# Patient Record
Sex: MALE | Race: WHITE | URBAN - METROPOLITAN AREA
[De-identification: names, ages, dates, MRNs, and addresses within clinical notes are randomized per-mention and may not be internally consistent; named-entity substitution may affect disease eponyms.]

---

## 2017-02-04 ENCOUNTER — OUTPATIENT (OUTPATIENT)
Dept: OUTPATIENT SERVICES | Facility: HOSPITAL | Age: 62
LOS: 1 days | Discharge: HOME | End: 2017-02-04

## 2017-06-27 DIAGNOSIS — R97.20 ELEVATED PROSTATE SPECIFIC ANTIGEN [PSA]: ICD-10-CM

## 2017-06-27 DIAGNOSIS — E11.9 TYPE 2 DIABETES MELLITUS WITHOUT COMPLICATIONS: ICD-10-CM

## 2017-09-04 ENCOUNTER — EMERGENCY (EMERGENCY)
Facility: HOSPITAL | Age: 62
LOS: 0 days | Discharge: HOME | End: 2017-09-04

## 2017-09-04 DIAGNOSIS — Z79.51 LONG TERM (CURRENT) USE OF INHALED STEROIDS: ICD-10-CM

## 2017-09-04 DIAGNOSIS — J18.9 PNEUMONIA, UNSPECIFIED ORGANISM: ICD-10-CM

## 2017-09-04 DIAGNOSIS — Z87.891 PERSONAL HISTORY OF NICOTINE DEPENDENCE: ICD-10-CM

## 2017-09-04 DIAGNOSIS — R07.81 PLEURODYNIA: ICD-10-CM

## 2017-09-04 DIAGNOSIS — I10 ESSENTIAL (PRIMARY) HYPERTENSION: ICD-10-CM

## 2017-09-04 DIAGNOSIS — Z79.899 OTHER LONG TERM (CURRENT) DRUG THERAPY: ICD-10-CM

## 2017-09-04 DIAGNOSIS — J44.9 CHRONIC OBSTRUCTIVE PULMONARY DISEASE, UNSPECIFIED: ICD-10-CM

## 2018-08-29 ENCOUNTER — OUTPATIENT (OUTPATIENT)
Dept: OUTPATIENT SERVICES | Facility: HOSPITAL | Age: 63
LOS: 1 days | Discharge: HOME | End: 2018-08-29

## 2018-08-29 DIAGNOSIS — N39.0 URINARY TRACT INFECTION, SITE NOT SPECIFIED: ICD-10-CM

## 2018-08-29 DIAGNOSIS — R53.81 OTHER MALAISE: ICD-10-CM

## 2018-08-29 DIAGNOSIS — Z79.899 OTHER LONG TERM (CURRENT) DRUG THERAPY: ICD-10-CM

## 2018-08-29 DIAGNOSIS — Z12.5 ENCOUNTER FOR SCREENING FOR MALIGNANT NEOPLASM OF PROSTATE: ICD-10-CM

## 2018-08-29 DIAGNOSIS — C61 MALIGNANT NEOPLASM OF PROSTATE: ICD-10-CM

## 2018-08-29 DIAGNOSIS — E78.00 PURE HYPERCHOLESTEROLEMIA, UNSPECIFIED: ICD-10-CM

## 2018-08-29 DIAGNOSIS — D50.9 IRON DEFICIENCY ANEMIA, UNSPECIFIED: ICD-10-CM

## 2018-08-30 ENCOUNTER — INPATIENT (INPATIENT)
Facility: HOSPITAL | Age: 63
LOS: 0 days | Discharge: HOME | End: 2018-08-31
Attending: INTERNAL MEDICINE | Admitting: INTERNAL MEDICINE
Payer: COMMERCIAL

## 2018-08-30 VITALS
HEIGHT: 68 IN | RESPIRATION RATE: 18 BRPM | OXYGEN SATURATION: 98 % | WEIGHT: 235.89 LBS | DIASTOLIC BLOOD PRESSURE: 95 MMHG | SYSTOLIC BLOOD PRESSURE: 135 MMHG | TEMPERATURE: 97 F | HEART RATE: 82 BPM

## 2018-08-30 LAB
ALBUMIN SERPL ELPH-MCNC: 4.3 G/DL — SIGNIFICANT CHANGE UP (ref 3.5–5.2)
ALP SERPL-CCNC: 56 U/L — SIGNIFICANT CHANGE UP (ref 30–115)
ALT FLD-CCNC: 37 U/L — SIGNIFICANT CHANGE UP (ref 0–41)
ANION GAP SERPL CALC-SCNC: 13 MMOL/L — SIGNIFICANT CHANGE UP (ref 7–14)
APTT BLD: 31.5 SEC — SIGNIFICANT CHANGE UP (ref 27–39.2)
AST SERPL-CCNC: 18 U/L — SIGNIFICANT CHANGE UP (ref 0–41)
BASE EXCESS BLDV CALC-SCNC: 2.2 MMOL/L — HIGH (ref -2–2)
BASOPHILS # BLD AUTO: 0.04 K/UL — SIGNIFICANT CHANGE UP (ref 0–0.2)
BASOPHILS NFR BLD AUTO: 0.5 % — SIGNIFICANT CHANGE UP (ref 0–1)
BILIRUB SERPL-MCNC: 0.6 MG/DL — SIGNIFICANT CHANGE UP (ref 0.2–1.2)
BUN SERPL-MCNC: 20 MG/DL — SIGNIFICANT CHANGE UP (ref 10–20)
CA-I SERPL-SCNC: 1.2 MMOL/L — SIGNIFICANT CHANGE UP (ref 1.12–1.3)
CALCIUM SERPL-MCNC: 9.2 MG/DL — SIGNIFICANT CHANGE UP (ref 8.5–10.1)
CHLORIDE SERPL-SCNC: 103 MMOL/L — SIGNIFICANT CHANGE UP (ref 98–110)
CO2 SERPL-SCNC: 25 MMOL/L — SIGNIFICANT CHANGE UP (ref 17–32)
CREAT SERPL-MCNC: 1.2 MG/DL — SIGNIFICANT CHANGE UP (ref 0.7–1.5)
EOSINOPHIL # BLD AUTO: 0.12 K/UL — SIGNIFICANT CHANGE UP (ref 0–0.7)
EOSINOPHIL NFR BLD AUTO: 1.6 % — SIGNIFICANT CHANGE UP (ref 0–8)
GAS PNL BLDV: 140 MMOL/L — SIGNIFICANT CHANGE UP (ref 136–145)
GAS PNL BLDV: SIGNIFICANT CHANGE UP
GLUCOSE SERPL-MCNC: 118 MG/DL — HIGH (ref 70–99)
HCO3 BLDV-SCNC: 28 MMOL/L — SIGNIFICANT CHANGE UP (ref 22–29)
HCT VFR BLD CALC: 43.6 % — SIGNIFICANT CHANGE UP (ref 42–52)
HCT VFR BLDA CALC: 47.2 % — HIGH (ref 34–44)
HGB BLD CALC-MCNC: 15.4 G/DL — SIGNIFICANT CHANGE UP (ref 14–18)
HGB BLD-MCNC: 14.4 G/DL — SIGNIFICANT CHANGE UP (ref 14–18)
HOROWITZ INDEX BLDV+IHG-RTO: 21 — SIGNIFICANT CHANGE UP
IMM GRANULOCYTES NFR BLD AUTO: 0.3 % — SIGNIFICANT CHANGE UP (ref 0.1–0.3)
INR BLD: 1.12 RATIO — SIGNIFICANT CHANGE UP (ref 0.65–1.3)
LACTATE BLDV-MCNC: 1.2 MMOL/L — SIGNIFICANT CHANGE UP (ref 0.5–1.6)
LYMPHOCYTES # BLD AUTO: 2.03 K/UL — SIGNIFICANT CHANGE UP (ref 1.2–3.4)
LYMPHOCYTES # BLD AUTO: 27.7 % — SIGNIFICANT CHANGE UP (ref 20.5–51.1)
MCHC RBC-ENTMCNC: 29 PG — SIGNIFICANT CHANGE UP (ref 27–31)
MCHC RBC-ENTMCNC: 33 G/DL — SIGNIFICANT CHANGE UP (ref 32–37)
MCV RBC AUTO: 87.7 FL — SIGNIFICANT CHANGE UP (ref 80–94)
MONOCYTES # BLD AUTO: 0.47 K/UL — SIGNIFICANT CHANGE UP (ref 0.1–0.6)
MONOCYTES NFR BLD AUTO: 6.4 % — SIGNIFICANT CHANGE UP (ref 1.7–9.3)
NEUTROPHILS # BLD AUTO: 4.64 K/UL — SIGNIFICANT CHANGE UP (ref 1.4–6.5)
NEUTROPHILS NFR BLD AUTO: 63.5 % — SIGNIFICANT CHANGE UP (ref 42.2–75.2)
NRBC # BLD: 0 /100 WBCS — SIGNIFICANT CHANGE UP (ref 0–0)
NT-PROBNP SERPL-SCNC: 699 PG/ML — HIGH (ref 0–300)
PCO2 BLDV: 48 MMHG — SIGNIFICANT CHANGE UP (ref 41–51)
PH BLDV: 7.38 — SIGNIFICANT CHANGE UP (ref 7.26–7.43)
PLATELET # BLD AUTO: 172 K/UL — SIGNIFICANT CHANGE UP (ref 130–400)
PO2 BLDV: 35 MMHG — SIGNIFICANT CHANGE UP (ref 20–40)
POTASSIUM BLDV-SCNC: 3.8 MMOL/L — SIGNIFICANT CHANGE UP (ref 3.3–5.6)
POTASSIUM SERPL-MCNC: 4.1 MMOL/L — SIGNIFICANT CHANGE UP (ref 3.5–5)
POTASSIUM SERPL-SCNC: 4.1 MMOL/L — SIGNIFICANT CHANGE UP (ref 3.5–5)
PROT SERPL-MCNC: 6.5 G/DL — SIGNIFICANT CHANGE UP (ref 6–8)
PROTHROM AB SERPL-ACNC: 12.1 SEC — SIGNIFICANT CHANGE UP (ref 9.95–12.87)
RBC # BLD: 4.97 M/UL — SIGNIFICANT CHANGE UP (ref 4.7–6.1)
RBC # FLD: 12.9 % — SIGNIFICANT CHANGE UP (ref 11.5–14.5)
SAO2 % BLDV: 66 % — SIGNIFICANT CHANGE UP
SODIUM SERPL-SCNC: 141 MMOL/L — SIGNIFICANT CHANGE UP (ref 135–146)
TROPONIN T SERPL-MCNC: <0.01 NG/ML — SIGNIFICANT CHANGE UP
WBC # BLD: 7.32 K/UL — SIGNIFICANT CHANGE UP (ref 4.8–10.8)
WBC # FLD AUTO: 7.32 K/UL — SIGNIFICANT CHANGE UP (ref 4.8–10.8)

## 2018-08-30 PROCEDURE — 93970 EXTREMITY STUDY: CPT | Mod: 26

## 2018-08-30 RX ORDER — BUDESONIDE AND FORMOTEROL FUMARATE DIHYDRATE 160; 4.5 UG/1; UG/1
2 AEROSOL RESPIRATORY (INHALATION)
Qty: 0 | Refills: 0 | Status: DISCONTINUED | OUTPATIENT
Start: 2018-08-30 | End: 2018-08-31

## 2018-08-30 RX ORDER — FUROSEMIDE 40 MG
20 TABLET ORAL ONCE
Qty: 0 | Refills: 0 | Status: COMPLETED | OUTPATIENT
Start: 2018-08-30 | End: 2018-08-30

## 2018-08-30 RX ORDER — ALBUTEROL 90 UG/1
2 AEROSOL, METERED ORAL
Qty: 0 | Refills: 0 | COMMUNITY

## 2018-08-30 RX ORDER — ENOXAPARIN SODIUM 100 MG/ML
40 INJECTION SUBCUTANEOUS EVERY 24 HOURS
Qty: 0 | Refills: 0 | Status: DISCONTINUED | OUTPATIENT
Start: 2018-08-30 | End: 2018-08-31

## 2018-08-30 RX ORDER — SODIUM CHLORIDE 9 MG/ML
3 INJECTION INTRAMUSCULAR; INTRAVENOUS; SUBCUTANEOUS ONCE
Qty: 0 | Refills: 0 | Status: COMPLETED | OUTPATIENT
Start: 2018-08-30 | End: 2018-08-30

## 2018-08-30 RX ORDER — ALBUTEROL 90 UG/1
2 AEROSOL, METERED ORAL EVERY 6 HOURS
Qty: 0 | Refills: 0 | Status: DISCONTINUED | OUTPATIENT
Start: 2018-08-30 | End: 2018-08-31

## 2018-08-30 RX ORDER — IPRATROPIUM/ALBUTEROL SULFATE 18-103MCG
3 AEROSOL WITH ADAPTER (GRAM) INHALATION EVERY 6 HOURS
Qty: 0 | Refills: 0 | Status: DISCONTINUED | OUTPATIENT
Start: 2018-08-30 | End: 2018-08-31

## 2018-08-30 RX ADMIN — ENOXAPARIN SODIUM 40 MILLIGRAM(S): 100 INJECTION SUBCUTANEOUS at 22:11

## 2018-08-30 RX ADMIN — SODIUM CHLORIDE 3 MILLILITER(S): 9 INJECTION INTRAMUSCULAR; INTRAVENOUS; SUBCUTANEOUS at 15:53

## 2018-08-30 RX ADMIN — Medication 20 MILLIGRAM(S): at 18:04

## 2018-08-30 RX ADMIN — BUDESONIDE AND FORMOTEROL FUMARATE DIHYDRATE 2 PUFF(S): 160; 4.5 AEROSOL RESPIRATORY (INHALATION) at 22:10

## 2018-08-30 NOTE — H&P ADULT - NSHPREVIEWOFSYSTEMS_GEN_ALL_CORE
CONSTITUTIONAL: No fever, weight loss, or fatigue  EYES: No eye pain, visual disturbances, or discharge  ENMT:  No difficulty hearing, tinnitus, vertigo; No sinus or throat pain  NECK: No pain or stiffness  BREASTS: No pain, masses, or nipple discharge  RESPIRATORY: No cough, wheezing, chills or hemoptysis; No shortness of breath  CARDIOVASCULAR: No chest pain, palpitations, dizziness, or leg swelling  GASTROINTESTINAL: No abdominal or epigastric pain. No nausea, vomiting, or hematemesis; No diarrhea or constipation. No melena or hematochezia.  GENITOURINARY: No dysuria, frequency, hematuria, or incontinence  NEUROLOGICAL: No headaches, memory loss, loss of strength, numbness, or tremors  SKIN: No itching, burning, rashes, or lesions   LYMPH NODES: No enlarged glands  ENDOCRINE: No heat or cold intolerance; No hair loss  MUSCULOSKELETAL: No joint pain or swelling; No muscle, back, or extremity pain  PSYCHIATRIC: No depression, anxiety, mood swings, or difficulty sleeping  HEME/LYMPH: No easy bruising, or bleeding gums  ALLERGY AND IMMUNOLOGIC: No hives or eczema CONSTITUTIONAL: No fever, weight loss, or fatigue  EYES: No eye pain, visual disturbances, or discharge  ENMT:  No difficulty hearing, tinnitus, vertigo; No sinus or throat pain  NECK: No pain or stiffness  BREASTS: No pain, masses, or nipple discharge  RESPIRATORY: No cough, wheezing, chills or hemoptysis; No shortness of breath  CARDIOVASCULAR: No chest pain, palpitations, dizziness, + leg swelling  GASTROINTESTINAL: No abdominal or epigastric pain. No nausea, vomiting, or hematemesis; No diarrhea or constipation. No melena or hematochezia.  GENITOURINARY: No dysuria, frequency, hematuria, or incontinence  NEUROLOGICAL: No headaches, memory loss, loss of strength, numbness, or tremors  SKIN: No itching, burning, rashes, or lesions   LYMPH NODES: No enlarged glands  ENDOCRINE: No heat or cold intolerance; No hair loss  MUSCULOSKELETAL: No joint pain or swelling; No muscle, back, or extremity pain  PSYCHIATRIC: No depression, anxiety, mood swings, or difficulty sleeping  HEME/LYMPH: No easy bruising, or bleeding gums  ALLERGY AND IMMUNOLOGIC: No hives or eczema

## 2018-08-30 NOTE — ED PROVIDER NOTE - NS ED ROS FT
CONST: No fever, chills or bodyaches  EYES: No pain, redness, drainage or visual changes.  ENT: No ear pain or discharge, nasal discharge or congestion. No sore throat  CARD: + orthopnea, + edema of lower extremities. No chest pain, palpitations  RESP: No SOB, cough  GI: No abdominal pain, N/V/D  : no urinary frequency urgency or dysuria.   MS: No joint pain, back pain or extremity pain/injury  SKIN: No rashes  NEURO: No headache, dizziness, paresthesias

## 2018-08-30 NOTE — ED PROVIDER NOTE - ATTENDING CONTRIBUTION TO CARE
63 yo male h/o COPD, DM, HTN, sent by PMD for evaluation of exertional SOB, intermittent chest pain across his lower chest, b/l leg swelling and 61 yo male h/o COPD, DM, HTN, sent by PMD for evaluation of exertional SOB, intermittent chest pain across his lower chest, b/l leg swelling .  Symptom onset while in [place], patient states it was extremely hot and humid there, came back and went to see his PMD who sent him to ED.  Denies any CP or SOB at this time.  Well-appearing, male resting on a stretcher, NAD, PERRL, mmm, speaking full sentences, nml work of breathing, lungs CTA b/l, no wheezing or crackles, RRR, distal pulses intact, abdomen soft, NT/ND, BS present in all quadrants, b/l pitting foot/ankle edema without calf asymmetry or ttp.  Will check labs, CXR,. ECG, plan to admit.

## 2018-08-30 NOTE — ED PROVIDER NOTE - CARE PLAN
Principal Discharge DX:	CHF (congestive heart failure)  Secondary Diagnosis:	Dyspnea  Secondary Diagnosis:	Chest pain

## 2018-08-30 NOTE — H&P ADULT - NSHPPHYSICALEXAM_GEN_ALL_CORE
VITALS:  T(F): 97.4 (08-30-18 @ 15:00), Max: 97.4 (08-30-18 @ 15:00)  HR: 88 (08-30-18 @ 17:24) (82 - 88)  BP: 144/92 (08-30-18 @ 17:24) (135/95 - 144/92)  RR: 19 (08-30-18 @ 17:24) (18 - 19)  SpO2: 96% (08-30-18 @ 17:24) (96% - 98%)    PHYSICAL EXAM:  GENERAL: NAD  HEAD:  Atraumatic, Normocephalic  EYES: conjunctiva and sclera clear  ENMT: Moist mucous membranes  NECK: Supple, Normal thyroid  NERVOUS SYSTEM:  Alert & Oriented X 3, Good concentration; Motor Strength 5/5 B/L upper and lower extremities  CHEST/LUNG: Clear to auscultation bilaterally; No rales, rhonchi, wheezing, or rubs  HEART: Regular rate and rhythm; No murmurs, rubs, or gallops  ABDOMEN: Soft, Nontender, Nondistended; Bowel sounds present  EXTREMITIES:  2+ Peripheral Pulses, No clubbing, cyanosis, mild pitting bipedal edema extending to the knee.  LYMPH: No lymphadenopathy noted  SKIN: No rashes or lesions

## 2018-08-30 NOTE — ED PROVIDER NOTE - PROGRESS NOTE DETAILS
Pt with no prior hx of CHF.  No recent stress test in 10 years.  Former hx of smoking.  Agrees with decision to admit for further work up. Heart score 4

## 2018-08-30 NOTE — ED PROVIDER NOTE - PHYSICAL EXAMINATION
CONST: Well appearing in NAD  EYES:  Sclera and conjunctiva clear.  ENT: No nasal discharge. Oropharynx normal appearing, no erythema or exudates. Uvula midline.  NECK: Non-tender, supple  CARD: RRR, Normal S1 S2; Normal rate and rhythm, no murmurs or muffled heart sounds.   RESP: mild crackles at bases, Equal BS B/L, No distress, speaking in full sentences without difficulty, no acute distress, no accessory muscle use  GI: Soft, non-tender, non-distended.  MS: +3 edema of bilateral lower extremities, no calf tenderness, Normal ROM in all extremities.  pedal pulses 2+ bilaterally  SKIN: Warm, dry, no acute rashes. Good turgor  NEURO: A&Ox3, No focal deficits. Strength 5/5 with no sensory deficits. Steady gait

## 2018-08-30 NOTE — H&P ADULT - NSHPSOCIALHISTORY_GEN_ALL_CORE
Lives at home with family.  Current smoker: cut down to 1ppd. Has been smoking for 45 years.  Occasional Alcohol use. Denies recreational drug use.

## 2018-08-30 NOTE — ED PROVIDER NOTE - OBJECTIVE STATEMENT
61 yo male with hx of COPD, prostate CA, HTN, HLD presents to the ED for evaluation of edema of lower extremities and orthopnea x 7 days.  Pt states that he recently went on vacation and noticed edema of lower extremities and started to develop orthopnea.  Since onset sx progressively worse with today being the worst prompting visit to the ED.  Admits to reduced exercise tolerance.  Adds no other complaints at this time.  Denies chest pain currently, pleuritic chest pain, nausea, vomiting, fever, chills, calf pain, abdominal pain. FHX of MI at 56 y.o.  last stress test > 10 years ago. + former smoker

## 2018-08-30 NOTE — H&P ADULT - ASSESSMENT
Assessment and Plan:    1. Lower extremity swelling:  r/o Systemic causes: Follow up ECHO, UA and Ultrasound.  Lower extremity Venous duplex.      2. COPD: No acute exacerbation  Not oxygen dependent.  Follow up with Dr. Weems.  Continue home medications      3. Hypertension:  Resume home medications.        DVT prophylaxis: Heparin. Patient is a 61 yo male h/o COPD not on home oxygen, BPH and HTN sent in by PMD for evaluation of exertional Dyspnea, bilateral ankle swelling and intermittent chest pain. Patient had been in his usual state of health until a few days ago during his time in [place] when symptoms started. Chest pain is across his lower chest, no radiation, associated with dyspnea, PND and b/l leg swelling. Patient at the time of presentation denied any CP or SOB but concerned about his leg swelling. He denied any Pain, prior episodes fever, chills, palpitations or prior cardiac work up.       Assessment and Plan:    1. Lower extremity swelling:  r/o Systemic causes: Follow up ECHO, UA and Ultrasound.  Lower extremity Venous duplex.  Hold off diuresis for now: very mild swelling. Hold Amlodipine as may cause Pulmonary and peripheral edema.      2. COPD: No acute exacerbation  Not oxygen dependent.  Follow up with Dr. Weems.  Continue home medications      3. Hypertension:  Hold Amlodipine.  Start Lisinopril.        DVT prophylaxis: Heparin.

## 2018-08-30 NOTE — H&P ADULT - PMH
COPD (chronic obstructive pulmonary disease)    Current smoker    HTN (hypertension)    Prostate disease

## 2018-08-31 ENCOUNTER — TRANSCRIPTION ENCOUNTER (OUTPATIENT)
Age: 63
End: 2018-08-31

## 2018-08-31 VITALS
HEART RATE: 75 BPM | SYSTOLIC BLOOD PRESSURE: 162 MMHG | TEMPERATURE: 96 F | DIASTOLIC BLOOD PRESSURE: 67 MMHG | RESPIRATION RATE: 16 BRPM

## 2018-08-31 LAB
CK SERPL-CCNC: 846 U/L — HIGH (ref 0–225)
TROPONIN T SERPL-MCNC: <0.01 NG/ML — SIGNIFICANT CHANGE UP
TROPONIN T SERPL-MCNC: <0.01 NG/ML — SIGNIFICANT CHANGE UP

## 2018-08-31 RX ORDER — LISINOPRIL 2.5 MG/1
10 TABLET ORAL DAILY
Qty: 0 | Refills: 0 | Status: DISCONTINUED | OUTPATIENT
Start: 2018-08-31 | End: 2018-08-31

## 2018-08-31 RX ORDER — FUROSEMIDE 40 MG
1 TABLET ORAL
Qty: 15 | Refills: 0
Start: 2018-08-31 | End: 2018-09-29

## 2018-08-31 RX ORDER — LISINOPRIL 2.5 MG/1
1 TABLET ORAL
Qty: 30 | Refills: 0 | OUTPATIENT
Start: 2018-08-31 | End: 2018-09-29

## 2018-08-31 RX ORDER — FUROSEMIDE 40 MG
20 TABLET ORAL DAILY
Qty: 0 | Refills: 0 | Status: DISCONTINUED | OUTPATIENT
Start: 2018-08-31 | End: 2018-08-31

## 2018-08-31 RX ORDER — AMLODIPINE BESYLATE 2.5 MG/1
1 TABLET ORAL
Qty: 0 | Refills: 0 | COMMUNITY

## 2018-08-31 RX ORDER — LEVOFLOXACIN 5 MG/ML
1 INJECTION, SOLUTION INTRAVENOUS
Qty: 7 | Refills: 0
Start: 2018-08-31 | End: 2018-09-06

## 2018-08-31 RX ADMIN — LISINOPRIL 10 MILLIGRAM(S): 2.5 TABLET ORAL at 12:10

## 2018-08-31 RX ADMIN — Medication 20 MILLIGRAM(S): at 12:10

## 2018-08-31 RX ADMIN — BUDESONIDE AND FORMOTEROL FUMARATE DIHYDRATE 2 PUFF(S): 160; 4.5 AEROSOL RESPIRATORY (INHALATION) at 07:54

## 2018-08-31 NOTE — DISCHARGE NOTE ADULT - CARE PROVIDERS DIRECT ADDRESSES
[email or web address]

## 2018-08-31 NOTE — DISCHARGE NOTE ADULT - PATIENT PORTAL LINK FT
You can access the [facility] Patient Portal, offered by [facility], by registering with the following website: [email or web address]

## 2018-08-31 NOTE — DISCHARGE NOTE ADULT - HOSPITAL COURSE
Patient is a 63 yo male h/o COPD not on home oxygen, BPH and HTN sent in by PMD for evaluation of exertional Dyspnea, bilateral ankle swelling and intermittent chest pain. Patient had been in his usual state of health until a few days ago during his time in [place] when symptoms started. Chest pain is across his lower chest, no radiation, associated with dyspnea, PND and b/l leg swelling. Patient at the time of presentation denied any CP or SOB but concerned about his leg swelling. He denied any Pain, prior episodes fever, chills, palpitations or prior cardiac work up.       Assessment and Plan:    1. Lower extremity swelling:  Systemic causes ruled out. ECHO unremarkable, LE ultrasound negative for DVT. LFTs wnl.  Started on Lasix 20 mg until seen by PMD.  ? Due to CCB. Advised to discontinue Amlodipine.      2. COPD: No acute exacerbation  Not oxygen dependent.  Follow up with Dr. Weems out patient.  Continue home medications      3. Hypertension:  Hold Amlodipine.  Started on Lisinopril.      4. Rt Lung mass:  r/o Malignancy.   Patient asked to follow up with Dr. Weems pulmonologist for further evaluation.  Smoking cessation advised. Patient is a 61 yo male h/o COPD not on home oxygen, BPH and HTN sent in by PMD for evaluation of exertional Dyspnea, bilateral ankle swelling and intermittent chest pain. Patient had been in his usual state of health until a few days ago during his time in [place] when symptoms started. Chest pain is across his lower chest, no radiation, associated with dyspnea, PND and b/l leg swelling. Patient at the time of presentation denied any CP or SOB but concerned about his leg swelling. He denied any Pain, prior episodes fever, chills, palpitations or prior cardiac work up.       Assessment and Plan:    1. Lower extremity swelling:  Systemic causes ruled out. ECHO unremarkable, LE ultrasound negative for DVT. LFTs wnl.  Started on Lasix 20 mg until seen by PMD.  ? Due to CCB. Advised to discontinue Amlodipine.      2. COPD: No acute exacerbation  Not oxygen dependent.  Follow up with Dr. Weems out patient.  Continue home medications      3. Hypertension:  Hold Amlodipine.  Started on Lisinopril.      4. Rt Lung mass:  r/o Malignancy. ? Focal Pneumonia. Levaquin ordered for 7 days.  Patient asked to follow up with Dr. Weems pulmonologist for further evaluation.  Smoking cessation advised.

## 2018-08-31 NOTE — DISCHARGE NOTE ADULT - CARE PLAN
Principal Discharge DX:	Lower extremity edema  Goal:	Prevent recurrence  Assessment and plan of treatment:	STOP Amlodipine. Take Lasix as prescribed.  Follow up with PMD.  Secondary Diagnosis:	HTN (hypertension)  Goal:	Good Blood pressure control  Assessment and plan of treatment:	Take Lisinopril as prescribed.  Secondary Diagnosis:	Abnormal abdominal CT scan  Goal:	Further evaluation to r/o Malignancy  Assessment and plan of treatment:	Please follow up with Dr. Weems within 1 week after discharge.  Secondary Diagnosis:	Current smoker  Goal:	Smoking cessation.  Assessment and plan of treatment:	Please quit smoking. Ask PMD for assistance if needed.

## 2018-08-31 NOTE — PROGRESS NOTE ADULT - SUBJECTIVE AND OBJECTIVE BOX
PHILIP XIOMY  62y  Male    Patient is a 62y old  Male who presents with a chief complaint of Bilateral ankle swelling (30 Aug 2018 18:36)      INTERVAL HPI/OVERNIGHT EVENTS:      REVIEW OF SYSTEMS:  CONSTITUTIONAL: No fever, weight loss, or fatigue  EYES: No eye pain, visual disturbances, or discharge  ENMT:  No difficulty hearing, tinnitus, vertigo; No sinus or throat pain  NECK: No pain or stiffness  BREASTS: No pain, masses, or nipple discharge  RESPIRATORY: No cough, wheezing, chills or hemoptysis; No shortness of breath  CARDIOVASCULAR: No chest pain, palpitations, dizziness, or leg swelling  GASTROINTESTINAL: No abdominal or epigastric pain. No nausea, vomiting, or hematemesis; No diarrhea or constipation. No melena or hematochezia.  GENITOURINARY: No dysuria, frequency, hematuria, or incontinence  NEUROLOGICAL: No headaches, memory loss, loss of strength, numbness, or tremors  SKIN: No itching, burning, rashes, or lesions   LYMPH NODES: No enlarged glands  ENDOCRINE: No heat or cold intolerance; No hair loss  MUSCULOSKELETAL: No joint pain or swelling; No muscle, back, or extremity pain  PSYCHIATRIC: No depression, anxiety, mood swings, or difficulty sleeping  HEME/LYMPH: No easy bruising, or bleeding gums  ALLERY AND IMMUNOLOGIC: No hives or eczema    VITALS:  T(F): 96.1 (08-31-18 @ 05:15), Max: 98.4 (08-30-18 @ 20:12)  HR: 75 (08-31-18 @ 05:15) (53 - 88)  BP: 162/67 (08-31-18 @ 05:15) (118/59 - 162/67)  RR: 16 (08-31-18 @ 05:15) (16 - 19)  SpO2: 96% (08-30-18 @ 17:24) (96% - 98%)    PHYSICAL EXAM:  GENERAL: NAD, well-groomed, well-developed  HEAD:  Atraumatic, Normocephalic  EYES: EOMI, PERRLA, conjunctiva and sclera clear  ENMT: No tonsillar erythema, exudates, or enlargement; Moist mucous membranes, Good dentition, No lesions  NECK: Supple, No JVD, Normal thyroid  NERVOUS SYSTEM:  Alert & Oriented X3, Good concentration; Motor Strength 5/5 B/L upper and lower extremities; DTRs 2+ intact and symmetric  CHEST/LUNG: Clear to percussion bilaterally; No rales, rhonchi, wheezing, or rubs  HEART: Regular rate and rhythm; No murmurs, rubs, or gallops  ABDOMEN: Soft, Nontender, Nondistended; Bowel sounds present  EXTREMITIES:  2+ Peripheral Pulses, No clubbing, cyanosis, or edema  LYMPH: No lymphadenopathy noted  SKIN: No rashes or lesions    Consultant(s) Notes Reviewed:  [x ] YES  [ ] NO  Care Discussed with Consultants/Other Providers [ x] YES  [ ] NO    LABS:                        14.4   7.32  )-----------( 172      ( 30 Aug 2018 15:30 )             43.6     08-30    141  |  103  |  20  ----------------------------<  118<H>  4.1   |  25  |  1.2    Ca    9.2      30 Aug 2018 15:30    TPro  6.5  /  Alb  4.3  /  TBili  0.6  /  DBili  x   /  AST  18  /  ALT  37  /  AlkPhos  56  08-30    CARDIAC MARKERS ( 31 Aug 2018 07:03 )  x     / <0.01 ng/mL / 846 U/L / x     / x      CARDIAC MARKERS ( 30 Aug 2018 15:30 )  x     / <0.01 ng/mL / x     / x     / x            MICROBIOLOGY: None      RADIOLOGY & ADDITIONAL TESTS:  X-ray Chest 2 Views PA/Lat (08.30.18 @ 15:47)   Support devices: None.    Cardiac/mediastinum/hilum: Unremarkable.    Lung parenchyma/Pleura: Right perihilar/suprahilar opacity. No pleural   effusion or air leak    Skeleton/soft tissues: Unremarkable.    Impression:      Right perihilar/suprahilar opacity. No pleural effusion or air leak  A call back request was created         US Abdomen Complete (08.30.18 @ 19:52)  LIVER:  The elongated liver is normal in contour and echogenicity   measuring 19.7 cm in length x 14.5 cm AP with no focal mass or dilatation   of the intrahepatic bile ducts..     GALLBLADDER/BILIARY TREE: The gallbladder is visualized with no evidence   of stones, sludge, wall thickening or a pericholecystic fluid collection.   The common bile duct is dilated measuring 0.76 cm..  There is a negative   sonographic Hernadez's sign.       PANCREAS: Obscured by bowel gas..    SPLEEN:  Within normal limits in size measuring 10.4 cm in length x 4.9   cm in width with no focal mass or perisplenic fluid collection.    KIDNEYS:  Right kidney measures 11.5 cm in length x 5.8 cm in width x 5.2   cm AP and the left kidney measures 13 cm x 5.3 cm x 6.8 cm. . Bilateral   renal vascular flow. No hydronephrosis, solid mass, stone or perinephric   fluid collection..       AORTA/IVC:  Visualized proximal portions unremarkable.    ASCITES:  No abdominal ascites or pleural effusions. Limited   visualization of a lobulated prostate.         IMPRESSION: Common bile duct dilatation 0.76 cm. .      VA Duplex Lower Ext Vein Scan, Bilat (08.30.18 @ 19:49)   The common femoral, great saphenous, femoral, popliteal and small   saphenous veins were visualized bilaterally with no evidence of deep   venous thrombosis    All veins were fully compressible.  There was presence of spontaneous   flow, augmentation with distal compression and phasicity.    The anterior tibial veins were  patent     The posterior tibial veins were  patent      The peroneal veins were patent.    Right Baker's cyst measuring 6.4 x 1.4 x 2.8 cm.    Left Baker's cyst measuring 2.3 x 3.6 x 1.6 cm.    Impression:    No evidence of deep venous thrombosis or superficial thrombophlebitis in   the bilateral lower extremities.    Baker's cyst as measured above        Imaging Personally Reviewed:  [x YES  [ ] NO    MEDICATIONS  (STANDING):  buDESOnide 160 MICROgram(s)/formoterol 4.5 MICROgram(s) Inhaler 2 Puff(s) Inhalation two times a day  enoxaparin Injectable 40 milliGRAM(s) SubCutaneous every 24 hours  furosemide    Tablet 20 milliGRAM(s) Oral daily  lisinopril 10 milliGRAM(s) Oral daily    MEDICATIONS  (PRN):  ALBUTerol    90 MICROgram(s) HFA Inhaler 2 Puff(s) Inhalation every 6 hours PRN Shortness of Breath and/or Wheezing  ALBUTerol/ipratropium for Nebulization 3 milliLiter(s) Nebulizer every 6 hours PRN Shortness of Breath and/or Wheezing    HEALTH ISSUES - PROBLEM Dx:  Current smoker  COPD (chronic obstructive pulmonary disease)  Prostate disease  HTN (hypertension) PHILIPJACINTOXIOMY  62y  Male    Patient is a 62y old  Male who presents with a chief complaint of Bilateral ankle swelling (30 Aug 2018 18:36)      INTERVAL HPI/OVERNIGHT EVENTS:  No interval events. Dyspnea and ankle swelling Resolved.  Patient requesting to be discharged home.      REVIEW OF SYSTEMS:  CONSTITUTIONAL: No fever, weight loss, or fatigue  EYES: No eye pain, visual disturbances, or discharge  ENMT:  No difficulty hearing, tinnitus, vertigo; No sinus or throat pain  NECK: No pain or stiffness  BREASTS: No pain, masses, or nipple discharge  RESPIRATORY: No cough, wheezing, chills or hemoptysis; No shortness of breath  CARDIOVASCULAR: No chest pain, palpitations, dizziness, or leg swelling  GASTROINTESTINAL: No abdominal or epigastric pain. No nausea, vomiting, or hematemesis; No diarrhea or constipation. No melena or hematochezia.  GENITOURINARY: No dysuria, frequency, hematuria, or incontinence  NEUROLOGICAL: No headaches, memory loss, loss of strength, numbness, or tremors  SKIN: No itching, burning, rashes, or lesions   LYMPH NODES: No enlarged glands  ENDOCRINE: No heat or cold intolerance; No hair loss  MUSCULOSKELETAL: No joint pain or swelling; No muscle, back, or extremity pain  PSYCHIATRIC: No depression, anxiety, mood swings, or difficulty sleeping  HEME/LYMPH: No easy bruising, or bleeding gums  ALLERGY AND IMMUNOLOGIC: No hives or eczema      VITALS:  T(F): 96.1 (08-31-18 @ 05:15), Max: 98.4 (08-30-18 @ 20:12)  HR: 75 (08-31-18 @ 05:15) (53 - 88)  BP: 162/67 (08-31-18 @ 05:15) (118/59 - 162/67)  RR: 16 (08-31-18 @ 05:15) (16 - 19)  SpO2: 96% (08-30-18 @ 17:24) (96% - 98%)    PHYSICAL EXAM:  GENERAL: NAD, well-groomed, well-developed  HEAD:  Atraumatic, Normocephalic  EYES: conjunctiva and sclera clear  ENMT: Moist mucous membranes  NECK: Supple, Normal thyroid  NERVOUS SYSTEM:  Alert & Oriented X 3, Good concentration; Motor Strength 5/5 B/L upper and lower extremities  CHEST/LUNG: Clear to auscultation bilaterally; No rales, rhonchi, wheezing, or rubs  HEART: Regular rate and rhythm; No murmurs, rubs, or gallops  ABDOMEN: Soft, Nontender, obese; Bowel sounds present  EXTREMITIES:  2+ Peripheral Pulses, No clubbing, cyanosis, or edema  LYMPH: No lymphadenopathy noted  SKIN: No rashes or lesions    Consultant(s) Notes Reviewed:  [x ] YES  [ ] NO  Care Discussed with Consultants/Other Providers [ x] YES  [ ] NO    LABS:                        14.4   7.32  )-----------( 172      ( 30 Aug 2018 15:30 )             43.6     08-30    141  |  103  |  20  ----------------------------<  118<H>  4.1   |  25  |  1.2    Ca    9.2      30 Aug 2018 15:30    TPro  6.5  /  Alb  4.3  /  TBili  0.6  /  DBili  x   /  AST  18  /  ALT  37  /  AlkPhos  56  08-30    CARDIAC MARKERS ( 31 Aug 2018 07:03 )  x     / <0.01 ng/mL / 846 U/L / x     / x      CARDIAC MARKERS ( 30 Aug 2018 15:30 )  x     / <0.01 ng/mL / x     / x     / x            MICROBIOLOGY: None      RADIOLOGY & ADDITIONAL TESTS:  X-ray Chest 2 Views PA/Lat (08.30.18 @ 15:47)   Support devices: None.    Cardiac/mediastinum/hilum: Unremarkable.    Lung parenchyma/Pleura: Right perihilar/suprahilar opacity. No pleural   effusion or air leak    Skeleton/soft tissues: Unremarkable.    Impression:      Right perihilar/suprahilar opacity. No pleural effusion or air leak  A call back request was created         US Abdomen Complete (08.30.18 @ 19:52)  LIVER:  The elongated liver is normal in contour and echogenicity   measuring 19.7 cm in length x 14.5 cm AP with no focal mass or dilatation   of the intrahepatic bile ducts..     GALLBLADDER/BILIARY TREE: The gallbladder is visualized with no evidence   of stones, sludge, wall thickening or a pericholecystic fluid collection.   The common bile duct is dilated measuring 0.76 cm..  There is a negative   sonographic Hernadez's sign.       PANCREAS: Obscured by bowel gas..    SPLEEN:  Within normal limits in size measuring 10.4 cm in length x 4.9   cm in width with no focal mass or perisplenic fluid collection.    KIDNEYS:  Right kidney measures 11.5 cm in length x 5.8 cm in width x 5.2   cm AP and the left kidney measures 13 cm x 5.3 cm x 6.8 cm. . Bilateral   renal vascular flow. No hydronephrosis, solid mass, stone or perinephric   fluid collection..       AORTA/IVC:  Visualized proximal portions unremarkable.    ASCITES:  No abdominal ascites or pleural effusions. Limited   visualization of a lobulated prostate.         IMPRESSION: Common bile duct dilatation 0.76 cm. .      VA Duplex Lower Ext Vein Scan, Bilat (08.30.18 @ 19:49)   The common femoral, great saphenous, femoral, popliteal and small   saphenous veins were visualized bilaterally with no evidence of deep   venous thrombosis    All veins were fully compressible.  There was presence of spontaneous   flow, augmentation with distal compression and phasicity.    The anterior tibial veins were  patent     The posterior tibial veins were  patent      The peroneal veins were patent.    Right Baker's cyst measuring 6.4 x 1.4 x 2.8 cm.    Left Baker's cyst measuring 2.3 x 3.6 x 1.6 cm.    Impression:    No evidence of deep venous thrombosis or superficial thrombophlebitis in   the bilateral lower extremities.    Baker's cyst as measured above        Imaging Personally Reviewed:  [x YES  [ ] NO    MEDICATIONS  (STANDING):  buDESOnide 160 MICROgram(s)/formoterol 4.5 MICROgram(s) Inhaler 2 Puff(s) Inhalation two times a day  enoxaparin Injectable 40 milliGRAM(s) SubCutaneous every 24 hours  furosemide    Tablet 20 milliGRAM(s) Oral daily  lisinopril 10 milliGRAM(s) Oral daily    MEDICATIONS  (PRN):  ALBUTerol    90 MICROgram(s) HFA Inhaler 2 Puff(s) Inhalation every 6 hours PRN Shortness of Breath and/or Wheezing  ALBUTerol/ipratropium for Nebulization 3 milliLiter(s) Nebulizer every 6 hours PRN Shortness of Breath and/or Wheezing    HEALTH ISSUES - PROBLEM Dx:  Current smoker  COPD (chronic obstructive pulmonary disease)  Prostate disease  HTN (hypertension)

## 2018-08-31 NOTE — DISCHARGE NOTE ADULT - OTHER SIGNIFICANT FINDINGS
LABS:                        14.4   7.32  )-----------( 172      ( 30 Aug 2018 15:30 )             43.6     08-30    141  |  103  |  20  ----------------------------<  118<H>  4.1   |  25  |  1.2    Ca    9.2      30 Aug 2018 15:30    TPro  6.5  /  Alb  4.3  /  TBili  0.6  /  DBili  x   /  AST  18  /  ALT  37  /  AlkPhos  56  08-30    CARDIAC MARKERS ( 31 Aug 2018 07:03 )  x     / <0.01 ng/mL / 846 U/L / x     / x      CARDIAC MARKERS ( 30 Aug 2018 15:30 )  x     / <0.01 ng/mL / x     / x     / x            MICROBIOLOGY: None      RADIOLOGY & ADDITIONAL TESTS:  X-ray Chest 2 Views PA/Lat (08.30.18 @ 15:47)   Support devices: None.    Cardiac/mediastinum/hilum: Unremarkable.    Lung parenchyma/Pleura: Right perihilar/suprahilar opacity. No pleural   effusion or air leak    Skeleton/soft tissues: Unremarkable.    Impression:      Right perihilar/suprahilar opacity. No pleural effusion or air leak  A call back request was created         US Abdomen Complete (08.30.18 @ 19:52)  LIVER:  The elongated liver is normal in contour and echogenicity   measuring 19.7 cm in length x 14.5 cm AP with no focal mass or dilatation   of the intrahepatic bile ducts..     GALLBLADDER/BILIARY TREE: The gallbladder is visualized with no evidence   of stones, sludge, wall thickening or a pericholecystic fluid collection.   The common bile duct is dilated measuring 0.76 cm..  There is a negative   sonographic Hernadez's sign.       PANCREAS: Obscured by bowel gas..    SPLEEN:  Within normal limits in size measuring 10.4 cm in length x 4.9   cm in width with no focal mass or perisplenic fluid collection.    KIDNEYS:  Right kidney measures 11.5 cm in length x 5.8 cm in width x 5.2   cm AP and the left kidney measures 13 cm x 5.3 cm x 6.8 cm. . Bilateral   renal vascular flow. No hydronephrosis, solid mass, stone or perinephric   fluid collection..       AORTA/IVC:  Visualized proximal portions unremarkable.    ASCITES:  No abdominal ascites or pleural effusions. Limited   visualization of a lobulated prostate.         IMPRESSION: Common bile duct dilatation 0.76 cm. .      VA Duplex Lower Ext Vein Scan, Bilat (08.30.18 @ 19:49)   The common femoral, great saphenous, femoral, popliteal and small   saphenous veins were visualized bilaterally with no evidence of deep   venous thrombosis    All veins were fully compressible.  There was presence of spontaneous   flow, augmentation with distal compression and phasicity.    The anterior tibial veins were  patent     The posterior tibial veins were  patent      The peroneal veins were patent.    Right Baker's cyst measuring 6.4 x 1.4 x 2.8 cm.    Left Baker's cyst measuring 2.3 x 3.6 x 1.6 cm.    Impression:    No evidence of deep venous thrombosis or superficial thrombophlebitis in   the bilateral lower extremities.    Baker's cyst as measured above

## 2018-08-31 NOTE — DISCHARGE NOTE ADULT - CARE PROVIDER_API CALL
Raffi Weems), Medicine  [address] [place] [address]  Phone: [phone]  Fax: [phone]    Bam Lake), Internal Medicine  [address] [place] [address]  Phone: [phone]  Fax: [phone]

## 2018-08-31 NOTE — PROGRESS NOTE ADULT - ASSESSMENT
Patient is a 61 yo male h/o COPD not on home oxygen, BPH and HTN sent in by PMD for evaluation of exertional Dyspnea, bilateral ankle swelling and intermittent chest pain. Patient had been in his usual state of health until a few days ago during his time in [place] when symptoms started. Chest pain is across his lower chest, no radiation, associated with dyspnea, PND and b/l leg swelling. Patient at the time of presentation denied any CP or SOB but concerned about his leg swelling. He denied any Pain, prior episodes fever, chills, palpitations or prior cardiac work up.       Assessment and Plan:    1. Lower extremity swelling:  r/o Systemic causes: Follow up ECHO, UA and Ultrasound.  Lower extremity Venous duplex.  Hold off diuresis for now: very mild swelling. Hold Amlodipine as may cause Pulmonary and peripheral edema.      2. COPD: No acute exacerbation  Not oxygen dependent.  Follow up with Dr. Weems.  Continue home medications      3. Hypertension:  Hold Amlodipine.  Start Lisinopril.        DVT prophylaxis: Heparin. Patient is a 61 yo male h/o COPD not on home oxygen, BPH and HTN sent in by PMD for evaluation of exertional Dyspnea, bilateral ankle swelling and intermittent chest pain. Patient had been in his usual state of health until a few days ago during his time in [place] when symptoms started. Chest pain is across his lower chest, no radiation, associated with dyspnea, PND and b/l leg swelling. Patient at the time of presentation denied any CP or SOB but concerned about his leg swelling. He denied any Pain, prior episodes fever, chills, palpitations or prior cardiac work up.       Assessment and Plan:    1. Lower extremity swelling:  Systemic causes ruled out. ECHO unremarkable, LE ultrasound negative for DVT. LFTs wnl.  Started on Lasix 20 mg until seen by PMD.  ? Due to CCB. Advised to discontinue Amlodipine.      2. COPD: No acute exacerbation  Not oxygen dependent.  Follow up with Dr. Weems out patient.  Continue home medications      3. Hypertension:  Hold Amlodipine.  Started on Lisinopril.      4. Rt Lung mass:  r/o Malignancy.   Patient asked to follow up with Dr. Weems pulmonologist for further evaluation.  Smoking cessation advised.      DVT prophylaxis: Heparin.  Disposition: Discharge home today. Patient is a 63 yo male h/o COPD not on home oxygen, BPH and HTN sent in by PMD for evaluation of exertional Dyspnea, bilateral ankle swelling and intermittent chest pain. Patient had been in his usual state of health until a few days ago during his time in [place] when symptoms started. Chest pain is across his lower chest, no radiation, associated with dyspnea, PND and b/l leg swelling. Patient at the time of presentation denied any CP or SOB but concerned about his leg swelling. He denied any Pain, prior episodes fever, chills, palpitations or prior cardiac work up.       Assessment and Plan:    1. Lower extremity swelling:  Systemic causes ruled out. ECHO unremarkable, LE ultrasound negative for DVT. LFTs wnl.  Started on Lasix 20 mg until seen by PMD.  ? Due to CCB. Advised to discontinue Amlodipine.      2. COPD: No acute exacerbation  Not oxygen dependent.  Follow up with Dr. Weems out patient.  Continue home medications      3. Hypertension:  Hold Amlodipine.  Started on Lisinopril.      4. Rt Lung mass:  r/o Malignancy. ? Focal Pneumonia. Levaquin ordered for 7 days.  Patient asked to follow up with Dr. Weems pulmonologist for further evaluation.  Smoking cessation advised.      DVT prophylaxis: Heparin.  Disposition: Discharge home today.

## 2018-08-31 NOTE — DISCHARGE NOTE ADULT - MEDICATION SUMMARY - MEDICATIONS TO TAKE
I will START or STAY ON the medications listed below when I get home from the hospital:    lisinopril 10 mg oral tablet  -- 1 tab(s) by mouth once a day  -- Indication: For Hypertension    tamsulosin 0.4 mg oral capsule  -- 1 cap(s) by mouth once a day  -- Indication: For BPH    Symbicort 160 mcg-4.5 mcg/inh inhalation aerosol  -- 2 puff(s) inhaled 2 times a day  -- Indication: For COPD (chronic obstructive pulmonary disease)    ProAir HFA 90 mcg/inh inhalation aerosol  -- 2 puff(s) inhaled 4 times a day, As Needed  -- Indication: For COPD (chronic obstructive pulmonary disease)    furosemide 20 mg oral tablet  -- 1 tab(s) by mouth every other day   -- Indication: For Edema

## 2018-08-31 NOTE — DISCHARGE NOTE ADULT - PLAN OF CARE
Prevent recurrence STOP Amlodipine. Take Lasix as prescribed.  Follow up with PMD. Good Blood pressure control Take Lisinopril as prescribed. Further evaluation to r/o Malignancy Please follow up with Dr. Weems within 1 week after discharge. Smoking cessation. Please quit smoking. Ask PMD for assistance if needed.

## 2018-09-01 LAB
HCV AB S/CO SERPL IA: 0.22 S/CO — SIGNIFICANT CHANGE UP
HCV AB SERPL-IMP: SIGNIFICANT CHANGE UP

## 2018-09-14 DIAGNOSIS — M71.21 SYNOVIAL CYST OF POPLITEAL SPACE [BAKER], RIGHT KNEE: ICD-10-CM

## 2018-09-14 DIAGNOSIS — I11.0 HYPERTENSIVE HEART DISEASE WITH HEART FAILURE: ICD-10-CM
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
## 2018-09-14 DIAGNOSIS — N40.0 BENIGN PROSTATIC HYPERPLASIA WITHOUT LOWER URINARY TRACT SYMPTOMS: ICD-10-CM

## 2018-09-14 DIAGNOSIS — M79.89 OTHER SPECIFIED SOFT TISSUE DISORDERS: ICD-10-CM

## 2018-09-14 DIAGNOSIS — Z87.891 PERSONAL HISTORY OF NICOTINE DEPENDENCE: ICD-10-CM

## 2018-09-14 DIAGNOSIS — M71.22 SYNOVIAL CYST OF POPLITEAL SPACE [BAKER], LEFT KNEE: ICD-10-CM

## 2018-09-14 DIAGNOSIS — E78.5 HYPERLIPIDEMIA, UNSPECIFIED: ICD-10-CM

## 2018-09-14 DIAGNOSIS — R91.8 OTHER NONSPECIFIC ABNORMAL FINDING OF LUNG FIELD: ICD-10-CM

## 2018-09-14 DIAGNOSIS — I50.9 HEART FAILURE, UNSPECIFIED: ICD-10-CM

## 2018-09-14 DIAGNOSIS — J44.9 CHRONIC OBSTRUCTIVE PULMONARY DISEASE, UNSPECIFIED: ICD-10-CM

## 2018-09-14 DIAGNOSIS — I87.2 VENOUS INSUFFICIENCY (CHRONIC) (PERIPHERAL): ICD-10-CM

## 2018-09-14 DIAGNOSIS — T50.905A ADVERSE EFFECT OF UNSPECIFIED DRUGS, MEDICAMENTS AND BIOLOGICAL SUBSTANCES, INITIAL ENCOUNTER: ICD-10-CM

## 2018-09-14 DIAGNOSIS — R93.5 ABNORMAL FINDINGS ON DIAGNOSTIC IMAGING OF OTHER ABDOMINAL REGIONS, INCLUDING RETROPERITONEUM: ICD-10-CM

## 2018-09-14 DIAGNOSIS — Z85.46 PERSONAL HISTORY OF MALIGNANT NEOPLASM OF PROSTATE: ICD-10-CM

## 2019-02-07 ENCOUNTER — EMERGENCY (EMERGENCY)
Facility: HOSPITAL | Age: 64
LOS: 0 days | Discharge: HOME | End: 2019-02-07
Attending: EMERGENCY MEDICINE | Admitting: EMERGENCY MEDICINE

## 2019-02-07 VITALS
OXYGEN SATURATION: 99 % | DIASTOLIC BLOOD PRESSURE: 87 MMHG | SYSTOLIC BLOOD PRESSURE: 138 MMHG | HEART RATE: 86 BPM | RESPIRATION RATE: 17 BRPM | TEMPERATURE: 99 F

## 2019-02-07 VITALS
SYSTOLIC BLOOD PRESSURE: 136 MMHG | RESPIRATION RATE: 18 BRPM | OXYGEN SATURATION: 96 % | WEIGHT: 233.91 LBS | DIASTOLIC BLOOD PRESSURE: 71 MMHG | TEMPERATURE: 98 F | HEART RATE: 92 BPM

## 2019-02-07 DIAGNOSIS — Z87.891 PERSONAL HISTORY OF NICOTINE DEPENDENCE: ICD-10-CM

## 2019-02-07 DIAGNOSIS — R05 COUGH: ICD-10-CM

## 2019-02-07 DIAGNOSIS — I10 ESSENTIAL (PRIMARY) HYPERTENSION: ICD-10-CM

## 2019-02-07 DIAGNOSIS — Z79.51 LONG TERM (CURRENT) USE OF INHALED STEROIDS: ICD-10-CM

## 2019-02-07 DIAGNOSIS — Z90.2 ACQUIRED ABSENCE OF LUNG [PART OF]: ICD-10-CM

## 2019-02-07 DIAGNOSIS — Z85.118 PERSONAL HISTORY OF OTHER MALIGNANT NEOPLASM OF BRONCHUS AND LUNG: ICD-10-CM

## 2019-02-07 DIAGNOSIS — Z79.2 LONG TERM (CURRENT) USE OF ANTIBIOTICS: ICD-10-CM

## 2019-02-07 DIAGNOSIS — R10.9 UNSPECIFIED ABDOMINAL PAIN: ICD-10-CM

## 2019-02-07 DIAGNOSIS — K44.9 DIAPHRAGMATIC HERNIA WITHOUT OBSTRUCTION OR GANGRENE: ICD-10-CM

## 2019-02-07 DIAGNOSIS — N40.0 BENIGN PROSTATIC HYPERPLASIA WITHOUT LOWER URINARY TRACT SYMPTOMS: ICD-10-CM

## 2019-02-07 DIAGNOSIS — K57.90 DIVERTICULOSIS OF INTESTINE, PART UNSPECIFIED, WITHOUT PERFORATION OR ABSCESS WITHOUT BLEEDING: ICD-10-CM

## 2019-02-07 DIAGNOSIS — L90.5 SCAR CONDITIONS AND FIBROSIS OF SKIN: ICD-10-CM

## 2019-02-07 DIAGNOSIS — M86.9 OSTEOMYELITIS, UNSPECIFIED: ICD-10-CM

## 2019-02-07 PROBLEM — J44.9 CHRONIC OBSTRUCTIVE PULMONARY DISEASE, UNSPECIFIED: Chronic | Status: ACTIVE | Noted: 2018-08-30

## 2019-02-07 PROBLEM — N42.9 DISORDER OF PROSTATE, UNSPECIFIED: Chronic | Status: ACTIVE | Noted: 2018-08-30

## 2019-02-07 LAB
ALBUMIN SERPL ELPH-MCNC: 4 G/DL — SIGNIFICANT CHANGE UP (ref 3.5–5.2)
ALP SERPL-CCNC: 74 U/L — SIGNIFICANT CHANGE UP (ref 30–115)
ALT FLD-CCNC: 21 U/L — SIGNIFICANT CHANGE UP (ref 0–41)
ANION GAP SERPL CALC-SCNC: 10 MMOL/L — SIGNIFICANT CHANGE UP (ref 7–14)
APPEARANCE UR: CLEAR — SIGNIFICANT CHANGE UP
AST SERPL-CCNC: 13 U/L — SIGNIFICANT CHANGE UP (ref 0–41)
BASOPHILS # BLD AUTO: 0.04 K/UL — SIGNIFICANT CHANGE UP (ref 0–0.2)
BASOPHILS NFR BLD AUTO: 0.5 % — SIGNIFICANT CHANGE UP (ref 0–1)
BILIRUB SERPL-MCNC: 0.2 MG/DL — SIGNIFICANT CHANGE UP (ref 0.2–1.2)
BILIRUB UR-MCNC: NEGATIVE — SIGNIFICANT CHANGE UP
BUN SERPL-MCNC: 16 MG/DL — SIGNIFICANT CHANGE UP (ref 10–20)
CALCIUM SERPL-MCNC: 9.1 MG/DL — SIGNIFICANT CHANGE UP (ref 8.5–10.1)
CHLORIDE SERPL-SCNC: 102 MMOL/L — SIGNIFICANT CHANGE UP (ref 98–110)
CO2 SERPL-SCNC: 28 MMOL/L — SIGNIFICANT CHANGE UP (ref 17–32)
COLOR SPEC: YELLOW — SIGNIFICANT CHANGE UP
CREAT SERPL-MCNC: 1 MG/DL — SIGNIFICANT CHANGE UP (ref 0.7–1.5)
DIFF PNL FLD: NEGATIVE — SIGNIFICANT CHANGE UP
EOSINOPHIL # BLD AUTO: 0.47 K/UL — SIGNIFICANT CHANGE UP (ref 0–0.7)
EOSINOPHIL NFR BLD AUTO: 5.6 % — SIGNIFICANT CHANGE UP (ref 0–8)
GLUCOSE SERPL-MCNC: 159 MG/DL — HIGH (ref 70–99)
GLUCOSE UR QL: NEGATIVE MG/DL — SIGNIFICANT CHANGE UP
HCT VFR BLD CALC: 44.8 % — SIGNIFICANT CHANGE UP (ref 42–52)
HGB BLD-MCNC: 14.8 G/DL — SIGNIFICANT CHANGE UP (ref 14–18)
IMM GRANULOCYTES NFR BLD AUTO: 0.4 % — HIGH (ref 0.1–0.3)
KETONES UR-MCNC: ABNORMAL
LACTATE SERPL-SCNC: 1.4 MMOL/L — SIGNIFICANT CHANGE UP (ref 0.5–2.2)
LEUKOCYTE ESTERASE UR-ACNC: NEGATIVE — SIGNIFICANT CHANGE UP
LIDOCAIN IGE QN: 17 U/L — SIGNIFICANT CHANGE UP (ref 7–60)
LYMPHOCYTES # BLD AUTO: 1.89 K/UL — SIGNIFICANT CHANGE UP (ref 1.2–3.4)
LYMPHOCYTES # BLD AUTO: 22.7 % — SIGNIFICANT CHANGE UP (ref 20.5–51.1)
MCHC RBC-ENTMCNC: 27.5 PG — SIGNIFICANT CHANGE UP (ref 27–31)
MCHC RBC-ENTMCNC: 33 G/DL — SIGNIFICANT CHANGE UP (ref 32–37)
MCV RBC AUTO: 83.3 FL — SIGNIFICANT CHANGE UP (ref 80–94)
MONOCYTES # BLD AUTO: 0.62 K/UL — HIGH (ref 0.1–0.6)
MONOCYTES NFR BLD AUTO: 7.4 % — SIGNIFICANT CHANGE UP (ref 1.7–9.3)
NEUTROPHILS # BLD AUTO: 5.28 K/UL — SIGNIFICANT CHANGE UP (ref 1.4–6.5)
NEUTROPHILS NFR BLD AUTO: 63.4 % — SIGNIFICANT CHANGE UP (ref 42.2–75.2)
NITRITE UR-MCNC: NEGATIVE — SIGNIFICANT CHANGE UP
PH UR: 5.5 — SIGNIFICANT CHANGE UP (ref 5–8)
PLATELET # BLD AUTO: 231 K/UL — SIGNIFICANT CHANGE UP (ref 130–400)
POTASSIUM SERPL-MCNC: 4.1 MMOL/L — SIGNIFICANT CHANGE UP (ref 3.5–5)
POTASSIUM SERPL-SCNC: 4.1 MMOL/L — SIGNIFICANT CHANGE UP (ref 3.5–5)
PROT SERPL-MCNC: 7 G/DL — SIGNIFICANT CHANGE UP (ref 6–8)
PROT UR-MCNC: NEGATIVE MG/DL — SIGNIFICANT CHANGE UP
RBC # BLD: 5.38 M/UL — SIGNIFICANT CHANGE UP (ref 4.7–6.1)
RBC # FLD: 13.1 % — SIGNIFICANT CHANGE UP (ref 11.5–14.5)
SODIUM SERPL-SCNC: 140 MMOL/L — SIGNIFICANT CHANGE UP (ref 135–146)
SP GR SPEC: 1.02 — SIGNIFICANT CHANGE UP (ref 1.01–1.03)
UROBILINOGEN FLD QL: 0.2 MG/DL — SIGNIFICANT CHANGE UP (ref 0.2–0.2)
WBC # BLD: 8.33 K/UL — SIGNIFICANT CHANGE UP (ref 4.8–10.8)
WBC # FLD AUTO: 8.33 K/UL — SIGNIFICANT CHANGE UP (ref 4.8–10.8)

## 2019-02-07 RX ORDER — LEVOFLOXACIN 5 MG/ML
1 INJECTION, SOLUTION INTRAVENOUS
Qty: 5 | Refills: 0
Start: 2019-02-07 | End: 2019-02-11

## 2019-02-07 NOTE — ED PROVIDER NOTE - MEDICAL DECISION MAKING DETAILS
Results d/w patient and spouse and CT findings went over in detail.  Pt will f/u with GI for diverticulosis, (never had colonoscopy).  I spoke with charlee Lala for his Dr Rush at [facility].  Rec pain control and to call for f/u appt tomorrow.  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  Needs outpatient follow up . No interventions at this time.  Pt has pain meds at home.  Will give abx for b/l nodular opacities karl with cough.  Pt will f/u with [facility].  Has appt for repeat scan at beginning opf March.  Pt instructed to return if any worsening symptoms or concerns.  They verbalize understanding.

## 2019-02-07 NOTE — ED PROVIDER NOTE - CARE PLAN
Principal Discharge DX:	Periostitis Principal Discharge DX:	Periostitis  Secondary Diagnosis:	Diverticulosis

## 2019-02-07 NOTE — ED PROVIDER NOTE - ATTENDING CONTRIBUTION TO CARE
62 yo M PMHx COPD, HTN, BPH, lung tumor with lobectomy 10/27/18 presents with c/o pain to right side around incision site x 2 weeks, worsening over the last 4 days.  Pain worse with movements and cough, + dry cough, no fevers or chills.  no abd pain.  On exam pt in NAD AAO x 3, Lungs CA B/L no wrr, abd is osft nt nd, + tender over surgical scar to right side lower rib area, mild swelling, no skin changes, no warmth, no edema, steady gait

## 2019-02-07 NOTE — ED PROVIDER NOTE - CARE PROVIDERS DIRECT ADDRESSES
[email or web address],DirectAddress_Unknown,DirectAddress_Unknown

## 2019-02-07 NOTE — ED PROVIDER NOTE - PHYSICAL EXAMINATION
AOx4, Non toxic appearing, NAD, speaking in full sentences.   Skin - warm and dry, no acute rash.   Head - normocephalic, atraumatic.   Eyes - PERRLA/EOMI, conjunctiva and sclera clear.   ENT- MM moist, no nasal discharge.  Pharynx unremarkable.    Neck - supple nt, no meningeal signs.   Heart - RRR s1s2 nl, no rub/murmur.   Lungs- No retractions, BS equal, CTAB. Well healed incision sites at the right lateral lower ribs, ttp over the incision sites.   Abdomen - soft, TTP over the RUQ with some fullness, muphys negative.  no r/g.   Extremities- moves all, +equal distal pulses, brisk cap refill, sensation wnl, normal ROM. No LE edema, calves nttp b/l.

## 2019-02-07 NOTE — ED PROVIDER NOTE - CARE PROVIDER_API CALL
Santosh Georges)  Surgery; Vascular Surgery  [address] [place] [address], [address] [address]  Phone: [phone]  Fax: [phone]  Follow Up Time:     Jena Laurent)  Gastroenterology  [address] [place] [address]  Phone: [phone]  Fax: [phone]  Follow Up Time:     Dr Howe @ [facility],   Phone: (   )    -  Fax: (   )    -  Follow Up Time:

## 2019-02-07 NOTE — ED PROVIDER NOTE - NS ED ROS FT
Constitutional: See HPI.  Eyes: No visual changes, eye pain or discharge. No Photophobia  ENMT:  No neck pain or stiffness. No limited ROM  Cardiac: No SOB or edema. No chest pain with exertion.  Respiratory: No respiratory distress. No hemoptysis.   GI: No nausea, vomiting, diarrhea.  : No dysuria, frequency or burning. No Discharge  MS: No myalgia, muscle weakness, joint pain.  Neuro: No headache or weakness. No LOC.  Skin: No skin rash.  Except as documented in the HPI, all other systems are negative.

## 2019-02-07 NOTE — ED PROVIDER NOTE - OBJECTIVE STATEMENT
64 yo M with a hx of right lobectomy secondary to lung CA done at [facility] 10/18, COPD, former smoker, HTN, BPH, presents with right flank pain. Progressively worsening over the past 2 weeks, located at and around the incision sites. States that he noted bulging around the area which is located in the right lateral lower rib area and RUQ quadrant. Associated with dry cough. Denies fevers, chills, hemoptysis, dyspnea, NVCD, dysuria, hematuria, leg swelling, calf pain, hx of dvts/PEs, recent travel.

## 2019-02-07 NOTE — ED PROVIDER NOTE - PROVIDER TOKENS
PROVIDER:[TOKEN:[[id number]]],PROVIDER:[TOKEN:[[id number]]],FREE:[LAST:[Dr Howe @ [facility]],PHONE:[(   )    -],FAX:[(   )    -]]

## 2019-02-11 ENCOUNTER — INBOUND DOCUMENT (OUTPATIENT)
Age: 64
End: 2019-02-11

## 2019-02-12 ENCOUNTER — APPOINTMENT (OUTPATIENT)
Dept: VASCULAR SURGERY | Facility: CLINIC | Age: 64
End: 2019-02-12
Payer: COMMERCIAL

## 2019-02-12 VITALS
SYSTOLIC BLOOD PRESSURE: 130 MMHG | HEIGHT: 68 IN | WEIGHT: 234 LBS | BODY MASS INDEX: 35.46 KG/M2 | DIASTOLIC BLOOD PRESSURE: 80 MMHG

## 2019-02-12 DIAGNOSIS — Z87.09 PERSONAL HISTORY OF OTHER DISEASES OF THE RESPIRATORY SYSTEM: ICD-10-CM

## 2019-02-12 DIAGNOSIS — I71.4 ABDOMINAL AORTIC ANEURYSM, W/OUT RUPTURE: ICD-10-CM

## 2019-02-12 DIAGNOSIS — I65.23 OCCLUSION AND STENOSIS OF BILATERAL CAROTID ARTERIES: ICD-10-CM

## 2019-02-12 PROCEDURE — 93880 EXTRACRANIAL BILAT STUDY: CPT

## 2019-02-12 PROCEDURE — 99202 OFFICE O/P NEW SF 15 MIN: CPT

## 2019-02-13 NOTE — ASSESSMENT
[FreeTextEntry1] : The patient is a 63-year-old gentleman with a history of lung CA status post pneumonectomy who presented to the emergency department with epigastric and chest pain. The patient had an incidental finding of a sending and descending thoracic infrarenal aorta and common iliac artery thrombus present. I reviewed the CT scan there was no evidence of significant mural thrombus present. I performed a carotid artery duplex given his history of smoking age and past medical history. Carotid arteries were widely pain no evidence of carotid stenosis. From my standpoint I see no vascular surgery intervention required for this patient and he should follow up with me on a p.r.n. basis.

## 2019-02-13 NOTE — HISTORY OF PRESENT ILLNESS
[FreeTextEntry1] : The patient is a 64 yo male s/p pneumonectomy Presented to the emergency department complaining of right epigastric and chest pain. Patient had a CT scan of his chest abdomen and pelvis and an incidental finding of mural thrombus present in the descending thoracic aorta, infrarenal Dumbly and right common iliac artery. The patient has a history of smoking he denies amaurosis fugax or TIA-like symptoms he denies claudication symptoms.

## 2019-04-30 ENCOUNTER — EMERGENCY (EMERGENCY)
Facility: HOSPITAL | Age: 64
LOS: 0 days | Discharge: HOME | End: 2019-04-30
Attending: EMERGENCY MEDICINE | Admitting: EMERGENCY MEDICINE
Payer: COMMERCIAL

## 2019-04-30 VITALS
RESPIRATION RATE: 17 BRPM | DIASTOLIC BLOOD PRESSURE: 66 MMHG | OXYGEN SATURATION: 95 % | SYSTOLIC BLOOD PRESSURE: 135 MMHG | HEART RATE: 83 BPM | TEMPERATURE: 97 F

## 2019-04-30 VITALS
TEMPERATURE: 98 F | HEART RATE: 89 BPM | OXYGEN SATURATION: 96 % | RESPIRATION RATE: 18 BRPM | DIASTOLIC BLOOD PRESSURE: 80 MMHG | SYSTOLIC BLOOD PRESSURE: 139 MMHG

## 2019-04-30 DIAGNOSIS — J44.9 CHRONIC OBSTRUCTIVE PULMONARY DISEASE, UNSPECIFIED: ICD-10-CM

## 2019-04-30 DIAGNOSIS — Z79.899 OTHER LONG TERM (CURRENT) DRUG THERAPY: ICD-10-CM

## 2019-04-30 DIAGNOSIS — R73.9 HYPERGLYCEMIA, UNSPECIFIED: ICD-10-CM

## 2019-04-30 DIAGNOSIS — F17.200 NICOTINE DEPENDENCE, UNSPECIFIED, UNCOMPLICATED: ICD-10-CM

## 2019-04-30 DIAGNOSIS — Z85.118 PERSONAL HISTORY OF OTHER MALIGNANT NEOPLASM OF BRONCHUS AND LUNG: ICD-10-CM

## 2019-04-30 DIAGNOSIS — I10 ESSENTIAL (PRIMARY) HYPERTENSION: ICD-10-CM

## 2019-04-30 DIAGNOSIS — Z79.51 LONG TERM (CURRENT) USE OF INHALED STEROIDS: ICD-10-CM

## 2019-04-30 LAB
ALBUMIN SERPL ELPH-MCNC: 3.9 G/DL — SIGNIFICANT CHANGE UP (ref 3.5–5.2)
ALP SERPL-CCNC: 76 U/L — SIGNIFICANT CHANGE UP (ref 30–115)
ALT FLD-CCNC: 20 U/L — SIGNIFICANT CHANGE UP (ref 0–41)
ANION GAP SERPL CALC-SCNC: 14 MMOL/L — SIGNIFICANT CHANGE UP (ref 7–14)
APPEARANCE UR: CLEAR — SIGNIFICANT CHANGE UP
AST SERPL-CCNC: 29 U/L — SIGNIFICANT CHANGE UP (ref 0–41)
B-OH-BUTYR SERPL-SCNC: 0.4 MMOL/L — SIGNIFICANT CHANGE UP
BASE EXCESS BLDV CALC-SCNC: 2.1 MMOL/L — HIGH (ref -2–2)
BASOPHILS # BLD AUTO: 0.04 K/UL — SIGNIFICANT CHANGE UP (ref 0–0.2)
BASOPHILS NFR BLD AUTO: 0.6 % — SIGNIFICANT CHANGE UP (ref 0–1)
BILIRUB SERPL-MCNC: 0.4 MG/DL — SIGNIFICANT CHANGE UP (ref 0.2–1.2)
BILIRUB UR-MCNC: NEGATIVE — SIGNIFICANT CHANGE UP
BUN SERPL-MCNC: 17 MG/DL — SIGNIFICANT CHANGE UP (ref 10–20)
CA-I SERPL-SCNC: 1.24 MMOL/L — SIGNIFICANT CHANGE UP (ref 1.12–1.3)
CALCIUM SERPL-MCNC: 9.6 MG/DL — SIGNIFICANT CHANGE UP (ref 8.5–10.1)
CHLORIDE SERPL-SCNC: 99 MMOL/L — SIGNIFICANT CHANGE UP (ref 98–110)
CO2 SERPL-SCNC: 23 MMOL/L — SIGNIFICANT CHANGE UP (ref 17–32)
COLOR SPEC: YELLOW — SIGNIFICANT CHANGE UP
CREAT SERPL-MCNC: 0.8 MG/DL — SIGNIFICANT CHANGE UP (ref 0.7–1.5)
DIFF PNL FLD: NEGATIVE — SIGNIFICANT CHANGE UP
EOSINOPHIL # BLD AUTO: 0.21 K/UL — SIGNIFICANT CHANGE UP (ref 0–0.7)
EOSINOPHIL NFR BLD AUTO: 3.1 % — SIGNIFICANT CHANGE UP (ref 0–8)
EPI CELLS # UR: ABNORMAL /HPF
GAS PNL BLDV: 136 MMOL/L — SIGNIFICANT CHANGE UP (ref 136–145)
GAS PNL BLDV: SIGNIFICANT CHANGE UP
GLUCOSE SERPL-MCNC: 335 MG/DL — HIGH (ref 70–99)
GLUCOSE UR QL: 500 MG/DL
HCO3 BLDV-SCNC: 28 MMOL/L — SIGNIFICANT CHANGE UP (ref 22–29)
HCT VFR BLD CALC: 44.8 % — SIGNIFICANT CHANGE UP (ref 42–52)
HCT VFR BLDA CALC: 46.6 % — HIGH (ref 34–44)
HGB BLD CALC-MCNC: 15.2 G/DL — SIGNIFICANT CHANGE UP (ref 14–18)
HGB BLD-MCNC: 14.8 G/DL — SIGNIFICANT CHANGE UP (ref 14–18)
HOROWITZ INDEX BLDV+IHG-RTO: 21 — SIGNIFICANT CHANGE UP
IMM GRANULOCYTES NFR BLD AUTO: 0.4 % — HIGH (ref 0.1–0.3)
KETONES UR-MCNC: ABNORMAL
LACTATE BLDV-MCNC: 1.3 MMOL/L — SIGNIFICANT CHANGE UP (ref 0.5–1.6)
LEUKOCYTE ESTERASE UR-ACNC: NEGATIVE — SIGNIFICANT CHANGE UP
LYMPHOCYTES # BLD AUTO: 1.5 K/UL — SIGNIFICANT CHANGE UP (ref 1.2–3.4)
LYMPHOCYTES # BLD AUTO: 22.5 % — SIGNIFICANT CHANGE UP (ref 20.5–51.1)
MAGNESIUM SERPL-MCNC: 2.1 MG/DL — SIGNIFICANT CHANGE UP (ref 1.8–2.4)
MCHC RBC-ENTMCNC: 27.7 PG — SIGNIFICANT CHANGE UP (ref 27–31)
MCHC RBC-ENTMCNC: 33 G/DL — SIGNIFICANT CHANGE UP (ref 32–37)
MCV RBC AUTO: 83.7 FL — SIGNIFICANT CHANGE UP (ref 80–94)
MONOCYTES # BLD AUTO: 0.43 K/UL — SIGNIFICANT CHANGE UP (ref 0.1–0.6)
MONOCYTES NFR BLD AUTO: 6.4 % — SIGNIFICANT CHANGE UP (ref 1.7–9.3)
NEUTROPHILS # BLD AUTO: 4.46 K/UL — SIGNIFICANT CHANGE UP (ref 1.4–6.5)
NEUTROPHILS NFR BLD AUTO: 67 % — SIGNIFICANT CHANGE UP (ref 42.2–75.2)
NITRITE UR-MCNC: NEGATIVE — SIGNIFICANT CHANGE UP
NRBC # BLD: 0 /100 WBCS — SIGNIFICANT CHANGE UP (ref 0–0)
PCO2 BLDV: 45 MMHG — SIGNIFICANT CHANGE UP (ref 41–51)
PH BLDV: 7.4 — SIGNIFICANT CHANGE UP (ref 7.26–7.43)
PH UR: 5.5 — SIGNIFICANT CHANGE UP (ref 5–8)
PLATELET # BLD AUTO: 279 K/UL — SIGNIFICANT CHANGE UP (ref 130–400)
PO2 BLDV: 32 MMHG — SIGNIFICANT CHANGE UP (ref 20–40)
POTASSIUM BLDV-SCNC: 4 MMOL/L — SIGNIFICANT CHANGE UP (ref 3.3–5.6)
POTASSIUM SERPL-MCNC: 5.7 MMOL/L — HIGH (ref 3.5–5)
POTASSIUM SERPL-SCNC: 5.7 MMOL/L — HIGH (ref 3.5–5)
PROT SERPL-MCNC: 7.3 G/DL — SIGNIFICANT CHANGE UP (ref 6–8)
PROT UR-MCNC: ABNORMAL MG/DL
RBC # BLD: 5.35 M/UL — SIGNIFICANT CHANGE UP (ref 4.7–6.1)
RBC # FLD: 12.7 % — SIGNIFICANT CHANGE UP (ref 11.5–14.5)
SAO2 % BLDV: 60 % — SIGNIFICANT CHANGE UP
SODIUM SERPL-SCNC: 136 MMOL/L — SIGNIFICANT CHANGE UP (ref 135–146)
SP GR SPEC: 1.02 — SIGNIFICANT CHANGE UP (ref 1.01–1.03)
UROBILINOGEN FLD QL: 0.2 MG/DL — SIGNIFICANT CHANGE UP (ref 0.2–0.2)
WBC # BLD: 6.67 K/UL — SIGNIFICANT CHANGE UP (ref 4.8–10.8)
WBC # FLD AUTO: 6.67 K/UL — SIGNIFICANT CHANGE UP (ref 4.8–10.8)
WBC UR QL: SIGNIFICANT CHANGE UP /HPF

## 2019-04-30 PROCEDURE — 99284 EMERGENCY DEPT VISIT MOD MDM: CPT

## 2019-04-30 RX ORDER — SODIUM CHLORIDE 9 MG/ML
1000 INJECTION INTRAMUSCULAR; INTRAVENOUS; SUBCUTANEOUS ONCE
Qty: 0 | Refills: 0 | Status: COMPLETED | OUTPATIENT
Start: 2019-04-30 | End: 2019-04-30

## 2019-04-30 RX ORDER — METFORMIN HYDROCHLORIDE 850 MG/1
1 TABLET ORAL
Qty: 60 | Refills: 0 | OUTPATIENT
Start: 2019-04-30 | End: 2019-05-29

## 2019-04-30 RX ADMIN — SODIUM CHLORIDE 2000 MILLILITER(S): 9 INJECTION INTRAMUSCULAR; INTRAVENOUS; SUBCUTANEOUS at 14:53

## 2019-04-30 NOTE — ED PROVIDER NOTE - CARE PROVIDERS DIRECT ADDRESSES
[email or web address]

## 2019-04-30 NOTE — ED PROVIDER NOTE - CLINICAL SUMMARY MEDICAL DECISION MAKING FREE TEXT BOX
Pt with hyperglycemia as above wihtout acidosis or anion gap, well appearing and po toelrant, no signs of infection, will d/c on metformin to f/u with PMD Dr Lake. Patient counseled regarding conditions which should prompt return.

## 2019-04-30 NOTE — ED PROVIDER NOTE - OBJECTIVE STATEMENT
64yo M with a h/o COPD, HTN, Lungs CA and prostate disease presents with high sugar. Pt states that for the last 2 days he has been increasingly thirsty and has been drinking fruit punch. Pt also with urinary frequency. Pt denies any CP, SOB, fever, nausea, vomiting. 64yo M with a h/o COPD, HTN, Lungs CA and prostate disease presents with elevated glucose. Pt states that for the last 2 days he has polydipsia and polyuria. patient states he has been drinking excessive amounts fruit punch. Pt also with urinary frequency. Pt denies any CP, SOB, fever, nausea, vomiting. patient without abdominal pain, cough, congestion.

## 2019-04-30 NOTE — ED PROVIDER NOTE - NSFOLLOWUPINSTRUCTIONS_ED_ALL_ED_FT
Hyperglycemia    Hyperglycemia occurs when the glucose (sugar) level in your blood is too high. Symptoms include increased urination, increased thirst, a dry mouth, or changes in appetite. If started on a medication, take exactly as prescribed by your health care professional. Maintain a healthy lifestyle and follow up with your primary care physician.    SEEK IMMEDIATE MEDICAL CARE IF YOU HAVE ANY OF THE FOLLOWING SYMPTOMS: shortness of breath, change in mental status, nausea or vomiting, fruity smell to your breath, or any signs of dehydration.

## 2019-04-30 NOTE — ED PROVIDER NOTE - CARE PROVIDER_API CALL
Bam Lake)  Internal Medicine  [address] [place] [address]  Phone: [phone]  Fax: [phone]  Follow Up Time:

## 2020-01-24 NOTE — H&P ADULT - HISTORY OF PRESENT ILLNESS
"Dear Provider,     You have ordered sleep LAB services to perform the sleep study for Emily CentenoKyungBurgess.  The sleep study that you ordered is complete.      Please find Sleep Study result in "Chart Review" under the "Media tab."      As the ordering provider, you are responsible for reviewing the results and implementing a treatment plan with your patient.    If you need a Sleep Medicine provider to explain the sleep study findings and arrange treatment for the patient, please refer patient for consultation to our Sleep Clinic via [facility] with Ambulatory Consult Sleep.    To do that please place an order for an  "Ambulatory Consult Sleep" - it will go to our clinic work queue for our Medical Assistant to contact the patient for an appointment.     For any questions, please contact our clinic staff at [phone] to talk to clinical staff.   " 63 yo male h/o COPD, DM, HTN, sent by PMD for evaluation of exertional SOB, intermittent chest pain across his lower chest, b/l leg swelling .  Symptom onset while in [place], patient states it was extremely hot and humid there, came back and went to see his PMD who sent him to ED.  Denies any CP or SOB at this time.  Well-appearing, male resting on a stretcher, NAD, PERRL, mmm, speaking full sentences, nml work of breathing, lungs CTA b/l, no wheezing or crackles, RRR, distal pulses intact, abdomen soft, NT/ND, BS present in all quadrants, b/l pitting foot/ankle edema without calf asymmetry or ttp.  Will check labs, CXR,. ECG, plan to admit. Patient is a 61 yo male h/o COPD not on home oxygen, BPH and HTN sent in by PMD for evaluation of exertional Dyspnea, bilateral ankle swelling and intermittent chest pain. Patient had been in his usual state of health until a few days ago during his time in [place] when symptoms started. Chest pain is across his lower chest, no radiation, associated with dyspnea, PND and b/l leg swelling. Patient at the time of presentation denied any CP or SOB but concerned about his leg swelling. He denied any Pain, prior episodes fever, chills, palpitations or prior cardiac work up.

## 2022-08-18 ENCOUNTER — APPOINTMENT (OUTPATIENT)
Dept: UROLOGY | Facility: CLINIC | Age: 67
End: 2022-08-18

## 2022-08-18 VITALS
BODY MASS INDEX: 34.56 KG/M2 | HEIGHT: 68 IN | HEART RATE: 72 BPM | DIASTOLIC BLOOD PRESSURE: 82 MMHG | WEIGHT: 228 LBS | SYSTOLIC BLOOD PRESSURE: 129 MMHG

## 2022-08-18 DIAGNOSIS — Z00.00 ENCOUNTER FOR GENERAL ADULT MEDICAL EXAMINATION W/OUT ABNORMAL FINDINGS: ICD-10-CM

## 2022-08-18 DIAGNOSIS — N40.1 BENIGN PROSTATIC HYPERPLASIA WITH LOWER URINARY TRACT SYMPMS: ICD-10-CM

## 2022-08-18 DIAGNOSIS — R39.9 UNSPECIFIED SYMPTOMS AND SIGNS INVOLVING THE GENITOURINARY SYSTEM: ICD-10-CM

## 2022-08-18 DIAGNOSIS — Z78.9 OTHER SPECIFIED HEALTH STATUS: ICD-10-CM

## 2022-08-18 DIAGNOSIS — R97.20 ELEVATED PROSTATE, SPECIFIC ANTIGEN [PSA]: ICD-10-CM

## 2022-08-18 DIAGNOSIS — N13.8 BENIGN PROSTATIC HYPERPLASIA WITH LOWER URINARY TRACT SYMPMS: ICD-10-CM

## 2022-08-18 LAB
BILIRUB UR QL STRIP: NORMAL
COLLECTION METHOD: NORMAL
GLUCOSE UR-MCNC: NORMAL
HCG UR QL: 0.2 EU/DL
HGB UR QL STRIP.AUTO: NORMAL
KETONES UR-MCNC: NORMAL
LEUKOCYTE ESTERASE UR QL STRIP: NORMAL
NITRITE UR QL STRIP: NORMAL
PH UR STRIP: 6
PROT UR STRIP-MCNC: NORMAL
SP GR UR STRIP: 1.02

## 2022-08-18 PROCEDURE — 81003 URINALYSIS AUTO W/O SCOPE: CPT | Mod: QW

## 2022-08-18 RX ORDER — METFORMIN HYDROCHLORIDE 625 MG/1
TABLET ORAL
Refills: 0 | Status: ACTIVE | COMMUNITY

## 2022-08-18 RX ORDER — ATORVASTATIN CALCIUM 80 MG/1
TABLET, FILM COATED ORAL
Refills: 0 | Status: ACTIVE | COMMUNITY

## 2022-08-18 RX ORDER — TAMSULOSIN HYDROCHLORIDE 0.4 MG/1
0.4 CAPSULE ORAL
Refills: 0 | Status: ACTIVE | COMMUNITY

## 2022-08-18 RX ORDER — LISINOPRIL 10 MG/1
10 TABLET ORAL
Refills: 0 | Status: ACTIVE | COMMUNITY

## 2022-09-26 NOTE — ED ADULT NURSE NOTE - CAS DISCH ACCOMP BY
Mother taking sibling to ER and will take patient for assessment as well.  No triage given.     Administrative documentation to document COVID-19 status.  COVID-19 status: Most recent home kit COVID-19 test positive    Onset of Symptom Date:  9/23/2022     Self

## 2022-10-20 ENCOUNTER — APPOINTMENT (OUTPATIENT)
Dept: UROLOGY | Facility: CLINIC | Age: 67
End: 2022-10-20

## 2023-01-04 ENCOUNTER — APPOINTMENT (OUTPATIENT)
Dept: UROLOGY | Facility: CLINIC | Age: 68
End: 2023-01-04

## 2023-02-22 ENCOUNTER — LABORATORY RESULT (OUTPATIENT)
Age: 68
End: 2023-02-22

## 2023-02-23 ENCOUNTER — NON-APPOINTMENT (OUTPATIENT)
Age: 68
End: 2023-02-23

## 2023-02-24 LAB
APPEARANCE: CLEAR
BACTERIA UR CULT: NORMAL
BILIRUBIN URINE: NEGATIVE
BLOOD URINE: ABNORMAL
COLOR: YELLOW
GLUCOSE QUALITATIVE U: NEGATIVE
KETONES URINE: NEGATIVE
LEUKOCYTE ESTERASE URINE: NEGATIVE
NITRITE URINE: NEGATIVE
PH URINE: 6
PROTEIN URINE: ABNORMAL
SPECIFIC GRAVITY URINE: >=1.03
UROBILINOGEN URINE: NORMAL

## 2023-02-27 ENCOUNTER — NON-APPOINTMENT (OUTPATIENT)
Age: 68
End: 2023-02-27

## 2023-03-01 LAB — URINE CYTOLOGY: NORMAL

## 2023-03-01 RX ORDER — SILODOSIN 8 MG/1
8 CAPSULE ORAL
Qty: 30 | Refills: 5 | Status: ACTIVE | COMMUNITY
Start: 2023-03-01 | End: 1900-01-01

## 2023-03-01 RX ORDER — METHENAMINE, BENZOIC ACID, PHENYL SALICYLATE, METHYLENE BLUE, AND HYOSCYAMINE SULFATE 81.6; 9; 36.2; 10.8; .12 MG/1; MG/1; MG/1; MG/1; MG/1
81.6 TABLET ORAL 3 TIMES DAILY
Qty: 21 | Refills: 1 | Status: ACTIVE | COMMUNITY
Start: 2023-03-01 | End: 1900-01-01

## 2023-03-02 RX ORDER — PHENAZOPYRIDINE 200 MG/1
200 TABLET, FILM COATED ORAL 3 TIMES DAILY
Qty: 21 | Refills: 2 | Status: ACTIVE | COMMUNITY
Start: 2023-03-02 | End: 1900-01-01

## 2023-03-15 ENCOUNTER — APPOINTMENT (OUTPATIENT)
Dept: UROLOGY | Facility: CLINIC | Age: 68
End: 2023-03-15

## 2023-03-16 ENCOUNTER — NON-APPOINTMENT (OUTPATIENT)
Age: 68
End: 2023-03-16

## 2023-03-22 ENCOUNTER — NON-APPOINTMENT (OUTPATIENT)
Age: 68
End: 2023-03-22

## 2023-03-23 ENCOUNTER — NON-APPOINTMENT (OUTPATIENT)
Age: 68
End: 2023-03-23

## 2023-05-11 ENCOUNTER — APPOINTMENT (OUTPATIENT)
Dept: ENDOCRINOLOGY | Facility: CLINIC | Age: 68
End: 2023-05-11

## 2023-09-22 NOTE — ED ADULT NURSE NOTE - CHIEF COMPLAINT
Pt arrived to clinic today for C4D15 treatment with Romidepsin infusion and tolerated well with no changes throughout therapy. Pt aware of side effects and number to call for any needs and discharged to home in NAD. Pt aware of f/u appts.      The patient is a 63y Male complaining of see chief complaint quote.

## 2024-02-26 ENCOUNTER — INPATIENT (INPATIENT)
Facility: HOSPITAL | Age: 69
LOS: 0 days | Discharge: HOME | DRG: 155 | End: 2024-02-27
Attending: STUDENT IN AN ORGANIZED HEALTH CARE EDUCATION/TRAINING PROGRAM | Admitting: STUDENT IN AN ORGANIZED HEALTH CARE EDUCATION/TRAINING PROGRAM
Payer: MEDICARE

## 2024-02-26 VITALS
SYSTOLIC BLOOD PRESSURE: 136 MMHG | TEMPERATURE: 97 F | WEIGHT: 229.94 LBS | HEART RATE: 118 BPM | RESPIRATION RATE: 18 BRPM | OXYGEN SATURATION: 98 % | DIASTOLIC BLOOD PRESSURE: 75 MMHG

## 2024-02-26 DIAGNOSIS — Z98.890 OTHER SPECIFIED POSTPROCEDURAL STATES: Chronic | ICD-10-CM

## 2024-02-26 DIAGNOSIS — L02.11 CUTANEOUS ABSCESS OF NECK: ICD-10-CM

## 2024-02-26 DIAGNOSIS — Z90.2 ACQUIRED ABSENCE OF LUNG [PART OF]: Chronic | ICD-10-CM

## 2024-02-26 LAB
ALBUMIN SERPL ELPH-MCNC: 3.8 G/DL — SIGNIFICANT CHANGE UP (ref 3.5–5.2)
ALP SERPL-CCNC: 53 U/L — SIGNIFICANT CHANGE UP (ref 30–115)
ALT FLD-CCNC: 21 U/L — SIGNIFICANT CHANGE UP (ref 0–41)
ANION GAP SERPL CALC-SCNC: 12 MMOL/L — SIGNIFICANT CHANGE UP (ref 7–14)
AST SERPL-CCNC: 15 U/L — SIGNIFICANT CHANGE UP (ref 0–41)
BASE EXCESS BLDV CALC-SCNC: 4.8 MMOL/L — HIGH (ref -2–3)
BASOPHILS # BLD AUTO: 0.02 K/UL — SIGNIFICANT CHANGE UP (ref 0–0.2)
BASOPHILS NFR BLD AUTO: 0.3 % — SIGNIFICANT CHANGE UP (ref 0–1)
BILIRUB SERPL-MCNC: 0.3 MG/DL — SIGNIFICANT CHANGE UP (ref 0.2–1.2)
BUN SERPL-MCNC: 19 MG/DL — SIGNIFICANT CHANGE UP (ref 10–20)
CA-I SERPL-SCNC: 1.21 MMOL/L — SIGNIFICANT CHANGE UP (ref 1.15–1.33)
CALCIUM SERPL-MCNC: 9.6 MG/DL — SIGNIFICANT CHANGE UP (ref 8.4–10.5)
CHLORIDE SERPL-SCNC: 98 MMOL/L — SIGNIFICANT CHANGE UP (ref 98–110)
CO2 SERPL-SCNC: 27 MMOL/L — SIGNIFICANT CHANGE UP (ref 17–32)
CREAT SERPL-MCNC: 0.9 MG/DL — SIGNIFICANT CHANGE UP (ref 0.7–1.5)
EGFR: 92 ML/MIN/1.73M2 — SIGNIFICANT CHANGE UP
EOSINOPHIL # BLD AUTO: 0.06 K/UL — SIGNIFICANT CHANGE UP (ref 0–0.7)
EOSINOPHIL NFR BLD AUTO: 1 % — SIGNIFICANT CHANGE UP (ref 0–8)
GAS PNL BLDV: 134 MMOL/L — LOW (ref 136–145)
GAS PNL BLDV: SIGNIFICANT CHANGE UP
GLUCOSE SERPL-MCNC: 190 MG/DL — HIGH (ref 70–99)
HCO3 BLDV-SCNC: 30 MMOL/L — HIGH (ref 22–29)
HCT VFR BLD CALC: 39.7 % — LOW (ref 42–52)
HCT VFR BLDA CALC: 40 % — SIGNIFICANT CHANGE UP (ref 39–51)
HGB BLD CALC-MCNC: 13.4 G/DL — SIGNIFICANT CHANGE UP (ref 12.6–17.4)
HGB BLD-MCNC: 13.2 G/DL — LOW (ref 14–18)
IMM GRANULOCYTES NFR BLD AUTO: 1.4 % — HIGH (ref 0.1–0.3)
LACTATE BLDV-MCNC: 1.6 MMOL/L — SIGNIFICANT CHANGE UP (ref 0.5–2)
LYMPHOCYTES # BLD AUTO: 0.48 K/UL — LOW (ref 1.2–3.4)
LYMPHOCYTES # BLD AUTO: 7.6 % — LOW (ref 20.5–51.1)
MCHC RBC-ENTMCNC: 29 PG — SIGNIFICANT CHANGE UP (ref 27–31)
MCHC RBC-ENTMCNC: 33.2 G/DL — SIGNIFICANT CHANGE UP (ref 32–37)
MCV RBC AUTO: 87.3 FL — SIGNIFICANT CHANGE UP (ref 80–94)
MONOCYTES # BLD AUTO: 0.26 K/UL — SIGNIFICANT CHANGE UP (ref 0.1–0.6)
MONOCYTES NFR BLD AUTO: 4.1 % — SIGNIFICANT CHANGE UP (ref 1.7–9.3)
NEUTROPHILS # BLD AUTO: 5.39 K/UL — SIGNIFICANT CHANGE UP (ref 1.4–6.5)
NEUTROPHILS NFR BLD AUTO: 85.6 % — HIGH (ref 42.2–75.2)
NRBC # BLD: 0 /100 WBCS — SIGNIFICANT CHANGE UP (ref 0–0)
PCO2 BLDV: 48 MMHG — SIGNIFICANT CHANGE UP (ref 42–55)
PH BLDV: 7.41 — SIGNIFICANT CHANGE UP (ref 7.32–7.43)
PLATELET # BLD AUTO: 225 K/UL — SIGNIFICANT CHANGE UP (ref 130–400)
PMV BLD: 9.2 FL — SIGNIFICANT CHANGE UP (ref 7.4–10.4)
PO2 BLDV: 27 MMHG — SIGNIFICANT CHANGE UP (ref 25–45)
POTASSIUM BLDV-SCNC: 4.2 MMOL/L — SIGNIFICANT CHANGE UP (ref 3.5–5.1)
POTASSIUM SERPL-MCNC: 4.3 MMOL/L — SIGNIFICANT CHANGE UP (ref 3.5–5)
POTASSIUM SERPL-SCNC: 4.3 MMOL/L — SIGNIFICANT CHANGE UP (ref 3.5–5)
PROT SERPL-MCNC: 6.4 G/DL — SIGNIFICANT CHANGE UP (ref 6–8)
RBC # BLD: 4.55 M/UL — LOW (ref 4.7–6.1)
RBC # FLD: 13.1 % — SIGNIFICANT CHANGE UP (ref 11.5–14.5)
SAO2 % BLDV: 36.9 % — LOW (ref 67–88)
SODIUM SERPL-SCNC: 137 MMOL/L — SIGNIFICANT CHANGE UP (ref 135–146)
WBC # BLD: 6.3 K/UL — SIGNIFICANT CHANGE UP (ref 4.8–10.8)
WBC # FLD AUTO: 6.3 K/UL — SIGNIFICANT CHANGE UP (ref 4.8–10.8)

## 2024-02-26 PROCEDURE — 99285 EMERGENCY DEPT VISIT HI MDM: CPT | Mod: GC

## 2024-02-26 PROCEDURE — 87040 BLOOD CULTURE FOR BACTERIA: CPT

## 2024-02-26 PROCEDURE — 31575 DIAGNOSTIC LARYNGOSCOPY: CPT

## 2024-02-26 PROCEDURE — 94640 AIRWAY INHALATION TREATMENT: CPT

## 2024-02-26 PROCEDURE — 99223 1ST HOSP IP/OBS HIGH 75: CPT

## 2024-02-26 PROCEDURE — 99222 1ST HOSP IP/OBS MODERATE 55: CPT | Mod: 25

## 2024-02-26 PROCEDURE — 87641 MR-STAPH DNA AMP PROBE: CPT

## 2024-02-26 PROCEDURE — 87640 STAPH A DNA AMP PROBE: CPT | Mod: XU

## 2024-02-26 PROCEDURE — 70491 CT SOFT TISSUE NECK W/DYE: CPT | Mod: 26,MC

## 2024-02-26 RX ORDER — VANCOMYCIN HCL 1 G
1000 VIAL (EA) INTRAVENOUS ONCE
Refills: 0 | Status: COMPLETED | OUTPATIENT
Start: 2024-02-26 | End: 2024-02-26

## 2024-02-26 RX ORDER — PIPERACILLIN AND TAZOBACTAM 4; .5 G/20ML; G/20ML
3.38 INJECTION, POWDER, LYOPHILIZED, FOR SOLUTION INTRAVENOUS ONCE
Refills: 0 | Status: COMPLETED | OUTPATIENT
Start: 2024-02-26 | End: 2024-02-26

## 2024-02-26 RX ORDER — PIOGLITAZONE HYDROCHLORIDE 15 MG/1
1 TABLET ORAL
Refills: 0 | DISCHARGE

## 2024-02-26 RX ORDER — PIPERACILLIN AND TAZOBACTAM 4; .5 G/20ML; G/20ML
3.38 INJECTION, POWDER, LYOPHILIZED, FOR SOLUTION INTRAVENOUS ONCE
Refills: 0 | Status: COMPLETED | OUTPATIENT
Start: 2024-02-27 | End: 2024-02-27

## 2024-02-26 RX ORDER — ATORVASTATIN CALCIUM 80 MG/1
1 TABLET, FILM COATED ORAL
Refills: 0 | DISCHARGE

## 2024-02-26 RX ORDER — SILODOSIN 4 MG/1
1 CAPSULE ORAL
Refills: 0 | DISCHARGE

## 2024-02-26 RX ORDER — DEXAMETHASONE 0.5 MG/5ML
10 ELIXIR ORAL DAILY
Refills: 0 | Status: DISCONTINUED | OUTPATIENT
Start: 2024-02-27 | End: 2024-02-27

## 2024-02-26 RX ORDER — ENOXAPARIN SODIUM 100 MG/ML
40 INJECTION SUBCUTANEOUS EVERY 24 HOURS
Refills: 0 | Status: DISCONTINUED | OUTPATIENT
Start: 2024-02-26 | End: 2024-02-27

## 2024-02-26 RX ORDER — TAMSULOSIN HYDROCHLORIDE 0.4 MG/1
0.4 CAPSULE ORAL AT BEDTIME
Refills: 0 | Status: DISCONTINUED | OUTPATIENT
Start: 2024-02-26 | End: 2024-02-27

## 2024-02-26 RX ORDER — ATORVASTATIN CALCIUM 80 MG/1
10 TABLET, FILM COATED ORAL AT BEDTIME
Refills: 0 | Status: DISCONTINUED | OUTPATIENT
Start: 2024-02-26 | End: 2024-02-27

## 2024-02-26 RX ORDER — ACETAMINOPHEN 500 MG
650 TABLET ORAL EVERY 6 HOURS
Refills: 0 | Status: DISCONTINUED | OUTPATIENT
Start: 2024-02-26 | End: 2024-02-27

## 2024-02-26 RX ORDER — LINEZOLID 600 MG/300ML
600 INJECTION, SOLUTION INTRAVENOUS EVERY 12 HOURS
Refills: 0 | Status: DISCONTINUED | OUTPATIENT
Start: 2024-02-27 | End: 2024-02-27

## 2024-02-26 RX ORDER — PIPERACILLIN AND TAZOBACTAM 4; .5 G/20ML; G/20ML
3.38 INJECTION, POWDER, LYOPHILIZED, FOR SOLUTION INTRAVENOUS EVERY 8 HOURS
Refills: 0 | Status: DISCONTINUED | OUTPATIENT
Start: 2024-02-27 | End: 2024-02-27

## 2024-02-26 RX ORDER — LISINOPRIL 2.5 MG/1
10 TABLET ORAL DAILY
Refills: 0 | Status: DISCONTINUED | OUTPATIENT
Start: 2024-02-26 | End: 2024-02-27

## 2024-02-26 RX ORDER — ALBUTEROL 90 UG/1
1 AEROSOL, METERED ORAL
Refills: 0 | Status: DISCONTINUED | OUTPATIENT
Start: 2024-02-26 | End: 2024-02-27

## 2024-02-26 RX ORDER — TAMSULOSIN HYDROCHLORIDE 0.4 MG/1
1 CAPSULE ORAL
Qty: 0 | Refills: 0 | DISCHARGE

## 2024-02-26 RX ORDER — BUDESONIDE AND FORMOTEROL FUMARATE DIHYDRATE 160; 4.5 UG/1; UG/1
2 AEROSOL RESPIRATORY (INHALATION)
Refills: 0 | Status: DISCONTINUED | OUTPATIENT
Start: 2024-02-26 | End: 2024-02-27

## 2024-02-26 RX ORDER — BUDESONIDE AND FORMOTEROL FUMARATE DIHYDRATE 160; 4.5 UG/1; UG/1
2 AEROSOL RESPIRATORY (INHALATION)
Qty: 0 | Refills: 0 | DISCHARGE

## 2024-02-26 RX ORDER — LANOLIN ALCOHOL/MO/W.PET/CERES
3 CREAM (GRAM) TOPICAL AT BEDTIME
Refills: 0 | Status: DISCONTINUED | OUTPATIENT
Start: 2024-02-26 | End: 2024-02-27

## 2024-02-26 RX ORDER — DEXAMETHASONE 0.5 MG/5ML
10 ELIXIR ORAL ONCE
Refills: 0 | Status: COMPLETED | OUTPATIENT
Start: 2024-02-26 | End: 2024-02-26

## 2024-02-26 RX ORDER — SODIUM CHLORIDE 9 MG/ML
1000 INJECTION INTRAMUSCULAR; INTRAVENOUS; SUBCUTANEOUS ONCE
Refills: 0 | Status: COMPLETED | OUTPATIENT
Start: 2024-02-26 | End: 2024-02-26

## 2024-02-26 RX ADMIN — Medication 250 MILLIGRAM(S): at 14:07

## 2024-02-26 RX ADMIN — PIPERACILLIN AND TAZOBACTAM 200 GRAM(S): 4; .5 INJECTION, POWDER, LYOPHILIZED, FOR SOLUTION INTRAVENOUS at 13:51

## 2024-02-26 RX ADMIN — SODIUM CHLORIDE 2000 MILLILITER(S): 9 INJECTION INTRAMUSCULAR; INTRAVENOUS; SUBCUTANEOUS at 12:01

## 2024-02-26 RX ADMIN — PIPERACILLIN AND TAZOBACTAM 200 GRAM(S): 4; .5 INJECTION, POWDER, LYOPHILIZED, FOR SOLUTION INTRAVENOUS at 23:17

## 2024-02-26 RX ADMIN — Medication 10 MILLIGRAM(S): at 12:00

## 2024-02-26 NOTE — CONSULT NOTE ADULT - NS ATTEND AMEND GEN_ALL_CORE FT
Patient seen and examined at bedside.    Nasolaryngoscopy shows left VC paralysis. No airway obstruction on exam. No stridor or dyspnea at time of evaluation.    I reviewed and interpreted CT images showing a sialadenitis and swelling.    Recommend IV abx and steroids. Airway monitoring.

## 2024-02-26 NOTE — H&P ADULT - NSICDXPASTMEDICALHX_GEN_ALL_CORE_FT
PAST MEDICAL HISTORY:  COPD (chronic obstructive pulmonary disease)     Diabetes mellitus     HTN (hypertension)     Hyperlipidemia     Prostate disease     Stage 4 lung cancer

## 2024-02-26 NOTE — ED PROVIDER NOTE - NSICDXFAMILYHX_GEN_ALL_CORE_FT
HPI    81 years old  female referred to [facility] for breast cancer.  Patient has been seen and treated by Dr. Ma for right simple mastectomy.  Patient has breast cancer invasive ductal carcinoma.  Pathology on 03/29/2019 shows pT1c N0 MX tumor 1.1 cm.  Tumor is ERPR positive HER2 negative.  Negative margin and negative lymphovascular invasion and 5/5 lymph node negative for malignancy.    09/06/2019.  Patient here to follow-up no acute events.  Tolerating medication well. Denies chest pain shortness breath.  Denies abdominal pain nausea vomiting diarrhea.  Denies fever chills.  Fourteen point review system negative as above.    12/05/2019:  Oncology follow-up invasive ductal carcinoma no complaints.    06/12/2020:  Follow-up visit evaluation    09/11/2020 patient for evaluation visit.    12/11/20 patient is here for follow-up visit.    Past Medical History:   Diagnosis Date    Anxiety     Arthritis     GERD (gastroesophageal reflux disease)     Hyperlipidemia     Hypertension     Irritable bladder     Osteopenia 9/12/2016    Plantar fascial fibromatosis     PONV (postoperative nausea and vomiting)     Psychosis     [facility]    Thyroid disease     Vitamin deficiency      Social History     Socioeconomic History    Marital status:      Spouse name: Not on file    Number of children: Not on file    Years of education: Not on file    Highest education level: Not on file   Occupational History    Not on file   Social Needs    Financial resource strain: Not on file    Food insecurity     Worry: Not on file     Inability: Not on file    Transportation needs     Medical: Not on file     Non-medical: Not on file   Tobacco Use    Smoking status: Never Smoker    Smokeless tobacco: Never Used   Substance and Sexual Activity    Alcohol use: No    Drug use: No    Sexual activity: Not on file   Lifestyle    Physical activity      Days per week: Not on file     Minutes per session: Not on file    Stress: Not on file   Relationships    Social connections     Talks on phone: Not on file     Gets together: Not on file     Attends Holiness service: Not on file     Active member of club or organization: Not on file     Attends meetings of clubs or organizations: Not on file     Relationship status: Not on file   Other Topics Concern    Not on file   Social History Narrative    Not on file       Objective    Physical Exam   Vitals:    12/11/20 1010   BP: 137/67   Pulse: 66   Resp: 18   Temp: 98.4 °F (36.9 °C)        Constitutional:  Alert oriented x3 no acute distress  HENT:  Normocephalic atraumatic pupils groin regard to light extraocular muscles intact  Cardiovascular:  Regular rate and rhythm   No edema, no tenderness in the extremities.   Pulmonary/Chest:  Clear to auscultate  Abdominal:  Soft nontender nondistended bowel sounds x4  Musculoskeletal: Normal range of motion. Gait is normal. No clubbing, cyanosis     Lymphadenopathy:  Right lower extremity edema  Neurological:  Nonfocal.  Cranial nerves 2-12 grossly intact sensorimotor grossly intact.  Skin:  No rash no lesions  Psychiatric:  Pleasant normal affect    Lab Results   Component Value Date    WBC 5.14 12/06/2019    HGB 12.3 12/06/2019    HCT 36.6 (L) 12/06/2019     (H) 12/06/2019     12/06/2019     CMP  Sodium   Date Value Ref Range Status   12/11/2020 144 136 - 145 mmol/L Final   06/16/2015 146 (H) 137 - 145 MMOL/L Final     Potassium   Date Value Ref Range Status   12/11/2020 3.9 3.5 - 5.1 mmol/L Final   06/16/2015 4.3 3.5 - 5.1 MMOL/L Final     Chloride   Date Value Ref Range Status   12/11/2020 105 95 - 110 mmol/L Final   06/16/2015 106 98 - 107 MMOL/L Final     CO2   Date Value Ref Range Status   12/11/2020 32 (H) 22 - 31 mmol/L Final     Glucose   Date Value Ref Range Status   12/11/2020 116 (H) 70 - 110 mg/dL Final     Comment:     The ADA recommends the  FAMILY HISTORY:  No pertinent family history in first degree relatives     following guidelines for fasting glucose:  Normal:       less than 100 mg/dL  Prediabetes:  100 mg/dL to 125 mg/dL  Diabetes:     126 mg/dL or higher       BUN   Date Value Ref Range Status   12/11/2020 22 (H) 7 - 18 mg/dL Final     Creatinine   Date Value Ref Range Status   12/11/2020 1.23 0.50 - 1.40 mg/dL Final   06/16/2015 1.00 0.52 - 1.04 MG/DL Final     Calcium   Date Value Ref Range Status   12/11/2020 9.5 8.4 - 10.2 mg/dL Final     Total Protein   Date Value Ref Range Status   12/11/2020 7.3 6.0 - 8.4 g/dL Final     Albumin   Date Value Ref Range Status   12/11/2020 4.4 3.5 - 5.2 g/dL Final   06/16/2015 4.0 3.5 - 5.0 G/DL Final     Total Bilirubin   Date Value Ref Range Status   12/11/2020 0.9 0.2 - 1.3 mg/dL Final     Alkaline Phosphatase   Date Value Ref Range Status   12/11/2020 74 38 - 145 U/L Final     AST ([place])   Date Value Ref Range Status   03/30/2016 36 14 - 36 U/L Final     AST   Date Value Ref Range Status   12/11/2020 39 (H) 14 - 36 U/L Final     ALT   Date Value Ref Range Status   12/11/2020 21 0 - 35 U/L Final     Anion Gap   Date Value Ref Range Status   12/11/2020 7 (L) 8 - 16 mmol/L Final     eGFR if    Date Value Ref Range Status   12/11/2020 47 (A) >60 mL/min/1.73 m^2 Final     eGFR if non    Date Value Ref Range Status   12/11/2020 41 (A) >60 mL/min/1.73 m^2 Final     Comment:     Calculation used to obtain the estimated glomerular filtration  rate (eGFR) is the CKD-EPI equation.             Osteopenia of the left hip, normal bone mineral density of the lumbar spine    Consider FDA approved medical therapies in postmenopausal women and men aged 50 years and older, based on the following:    *A hip or vertebral (clinical or morphometric) fracture  *T score less than or equal to -2.5 at the femoral neck or spine after appropriate evaluation to exclude secondary causes.  *Low bone mass -- also known as osteopenia (T score between -1.0 and -2.5 at  the femoral neck or spine) and a 10 year probability of hip fracture greater than or equal to 3% or a 10 year probability of major osteoporosis-related fracture greater than or equal to 20% based on the [place]-adapted WHO algorithm.  *Clinicians judgment and/or patient preference may indicate treatment for people with 10 year fracture probabilities is above or below these levels.      Assessment Plan    [] [id number] ER/PA positive HER2 negative right breast cancer invasive ductal carcinoma.  Status post simple mastectomy with lymph node dissection.  Negative lymphovascular invasion, negative margin, lymph node 5/5 negative.    Continue Arimidex plan for total 5 years (Arimidex started on September 2019.)    [] Continue all other routine surveillance protocol    [] Mammogram  > Mammogram 9/10/20 negative      [] Osteopenia osteoporosis Calcium vitamin daily    [] RTC 3 months with laboratory studies.    M*Modal dictation

## 2024-02-26 NOTE — ED PROVIDER NOTE - ATTENDING CONTRIBUTION TO CARE
pt with increasing left submandibular swelling.inudration and swelling of left submandibular region, no finrmess to floor of mouth, ct shows  Enlarged, edematous left submandibular gland with surrounding   inflammatory changes consistent with sialoadenitis. No sialolithiasis. In addition, extensive edema involving the left oropharyngeal and supraglottic laryngeal walls, with enlarged, edematous epiglottis and left aryepiglottic fold. Apparent 3 x 2.5 cm fluid collection at the left tongue base consistent with severe edema or phlegmon/abscess. Pt given decadron, abx. seen by ent will transfer ED to ED.

## 2024-02-26 NOTE — ED ADULT TRIAGE NOTE - RESPIRATORY RATE (BREATHS/MIN)
-- DO NOT REPLY / DO NOT REPLY ALL --  -- Message is from the [facility]--    General Patient Message      Reason for Call: Patient is requesting that referral for sleep study test to be faxed over. The specialist will not be able to schedule appointment until referral is faxed.     FAX NUMBER:  [phone]    Caller Information       Type Contact Phone    02/04/2020 10:50 AM Phone (Incoming) Torrie Gaxiola (Emergency Contact) [phone]          Alternative phone number: NONE    Turnaround time given to caller:   \"This message will be sent to [state Provider's name]. The clinical team will fulfill your request as soon as they review your message.\"}     18

## 2024-02-26 NOTE — ED ADULT NURSE NOTE - NSICDXNOFAMILYHX_GEN_ALL_CORE
Moderate persistent asthma; CHALINO  Summary: Problems with cough and wheeze intermittently. Requiring albuterol on average of 5 times per week. Stopped flovent. Chest CT showed Multiple pulmonary nodules in the lungs with the largest measuring up  to 6.5 mm, no significant change 2/2023     Mild improvement on Trelegy. Some continued mucus production.      Continue trelegy 100/62.5/25 mcg 1 puff daily. Rinse mouth out after use.   Start montelukast (Singulair) 10 mg nightly. Singulair's (montelukast) potential but rare adverse emotional effects (depression, suicidal ideation, anxiety, behavior change, and sleep disturbance) were discussed.  It should be stopped immediately if any emotional disturbance is noted.  Continue albuterol HFA 2 puffs or nebs every 4 hours as needed.   Continue CPAP and follow up with pulmonology for sleep apnea.     Allergic Rhinitis (trees, grasses, ragweed, dust mites 2019 SPT at [facility]); ESS 6/2022.   Summary: Immunocap showed elevations to ragweed and dust mites 4/2023. Sinus surgery in 2022.     Sinus rinses have been helpful.      Plan:  Continue fexofenadine (Allegra) 180 mg daily.   Continue fluticasone 1-2 sprays in each nostril daily OR  Continue once daily budesonide rinses. (Per ENT) Stop fluticasone while on these rinses.   Start singulair.   Continue nasal saline rinses twice daily.   Consider checking SPT to full adult aeroallergen panel 5/2023.      EoE  Summary: Immunocap showed minimal elevation to wheat, negative to remaining foods on EE food panel 4/2023.  History of dysphagia.  He was on swallowed Flovent (prescribed in 2019) but has stopped since that time. Dysphagia has significantly improved since his 5/2022 EGD. He has not been tested for foods or tried food elimination.      EGD and esophagus eosinophil count/high powered field summary:     3/NA/40 (proximal/mid/distal) on 9/2019 while on omeprazole 40 mg daily.  NA/0/0 (proximal/mid/distal) on 5/23/2022 while on  omeprazole 40 mg daily      We discussed at length eosinophilic esophagitis (EoE) and its clinical implications.  Food associations are common and basic elimination has been noted to be helpful in 70% of cases. Other treatment options include elemental diet (impractical) and swallowed inhaled corticosteroid (flovent or budesonide).  Overall, my strong preference is to avoid prolonged swallowed steroid if possible by pursuing a directed elimination diet without negatively impacting nutrition.  Presently, these foods are best identified via skin prick and RAST (allergy lab) testing.     http://www.aaaai.org/conditions-and-treatments/related-conditions/eosinophilic-esophagitis was recommended as a useful web site resource.     Continue omeprazole 40 mg daily.  Recheck EGD with proximal/mid/distal esophageal biopsies 2-3 months following decision on treatment plan with GI MD approval.    Follow up in 6 months, sooner if interested in SPT.    <-- Click to add NO pertinent Family History

## 2024-02-26 NOTE — H&P ADULT - ATTENDING COMMENTS
HPI:  68-year-old male past medical history of active stage IV squamous cell lung cancer (status post radiation, immunotherapy and experimental chemo, pending biopsy of bladder to direct further chemo follows at [facility] [place] [place]), diabetes, hypertension, COPD on 2.5L at home [place] ([facility] program), easy bruising, presenting for enlarging left-sided neck mass for the past 4 days.  Patient reports having cold symptoms 2 weeks ago of fevers, cough, sore throat and congestion that improved, then returned a week ago that resolved until he started experience left sided neck swelling 4 days ago. Patient went to urgent care yesterday, given Amoxicillin (or azithromycin, patient uncertain), for possible throat infection. Swelling continued to worsen so patient was seen at [facility], given Zosyn, decadron, CT neck showing mass/hlegmon vs abscess and transferred to [place] for management. patient reports some mild pain, hoarse voice and increased shortness of breathe these past few days. Denies any recent dental procedures, has dentures from gum disease. Patient does have a history of sialolith >10 years ago when calcium deposit was removed under tongue.     In the ED, patient hemodynamically stable, afebrile Tmax 97.1, HR 89, BP 130s-160s/70-80s.SpO2: 96% on 3 NC. ENT was consulted for left submandibular gland sialoadenitis without sialolith. nasolaryngoscopy showing patent airway, left TVC paresis (likely related to patient stage IVb lung Ca), do not visualize L BOT abscess (CT likely reflecting edema). Given Zosyn, vancomycin and decadron 10mg admitted to medicine.  (26 Feb 2024 22:11)      REVIEW OF SYSTEMS: see cc/HPI   CONSTITUTIONAL: No weakness, fevers or chills  EYES/ENT: No visual changes;  No vertigo or throat pain   NECK: No pain or stiffness  RESPIRATORY: No cough, wheezing, hemoptysis; No shortness of breath  CARDIOVASCULAR: No chest pain or palpitations  GASTROINTESTINAL: No abdominal or epigastric pain. No nausea, vomiting, or hematemesis; No diarrhea or constipation. No melena or hematochezia.  GENITOURINARY: No dysuria, frequency or hematuria  NEUROLOGICAL: No numbness or weakness  SKIN: No itching, rashes  ENDO: No hyperglycemia, No thyroid disorder, No dyslipidemia   HEM: No bleeding, No easy bruising, No anemia   PSYCHE: No psychosis, No mood disorder No hallucinations No delusion   MSK: No deformity, No fracture, No Joint swelling    Physical Exam:  General: WN/WD NAD  Neurology: A&Ox3, nonfocal, follows commands  Eyes: PERRLA/ EOMI  ENT/Neck: Neck supple, trachea midline, No JVD  Respiratory: CTA B/L, No wheezing, rales, rhonchi  CV: Normal rate regular rhythm, S1S2, no murmurs, rubs or gallops  Abdominal: Soft, NT, ND +BS,   Extremities: No edema, + peripheral pulses  Skin: No Rashes, Hematoma, Ecchymosis  Incisions:   Tubes: HPI:  68-year-old male past medical history of active stage IV squamous cell lung cancer (status post radiation, immunotherapy and experimental chemo, pending biopsy of bladder to direct further chemo follows at [facility] [place]), diabetes, hypertension, COPD on 2.5L at home [place] ([facility] program), easy bruising, presenting for enlarging left-sided neck mass for the past 4 days.  Patient reports having cold symptoms 2 weeks ago of fevers, cough, sore throat and congestion that improved, then returned a week ago that resolved until he started experience left sided neck swelling 4 days ago. Patient went to urgent care yesterday, given Amoxicillin (or azithromycin, patient uncertain), for possible throat infection. Swelling continued to worsen so patient was seen at [facility], given Zosyn, decadron, CT neck showing mass/phlegmon vs abscess and transferred to [place] for management. patient reports some mild pain, hoarse voice and increased shortness of breathe these past few days. Denies any recent dental procedures, has dentures from gum disease. Patient does have a history of sialolith >10 years ago when calcium deposit was removed under tongue.     In the ED, patient hemodynamically stable, afebrile Tmax 97.1, HR 89, BP 130s-160s/70-80s.SpO2: 96% on 3 NC. ENT was consulted for left submandibular gland sialoadenitis without sialolith. nasolaryngoscopy showing patent airway, left TVC paresis (likely related to patient stage IVb lung Ca), do not visualize L BOT abscess (CT likely reflecting edema). Given Zosyn, vancomycin and decadron 10mg admitted to medicine.  (26 Feb 2024 22:11)      REVIEW OF SYSTEMS: see cc/HPI   CONSTITUTIONAL: No weakness, fevers or chills  EYES/ENT: No visual changes;  No vertigo or throat pain   NECK: (+) swelling/ mass (+) tenderness (+) pain (+) stiffness  RESPIRATORY: No cough, wheezing, hemoptysis; No shortness of breath  CARDIOVASCULAR: No chest pain or palpitations  GASTROINTESTINAL: No abdominal or epigastric pain. No nausea, vomiting, or hematemesis; No diarrhea or constipation. No melena or hematochezia.  GENITOURINARY: No dysuria, frequency or hematuria  NEUROLOGICAL: No numbness or weakness  SKIN: No itching, rashes, (+) erythema around the neck area  ENDO: No hyperglycemia, No thyroid disorder, No dyslipidemia   HEM: No bleeding, No easy bruising, No anemia   PSYCHE: No psychosis, No mood disorder No hallucinations No delusion   MSK: No deformity, No fracture, No Joint swelling    Physical Exam:  General: WN/WD NAD  Neurology: A&Ox3, nonfocal, follows commands  Eyes: PERRLA/ EOMI  ENT/Neck: (+) L sided -ant cervical and submandibular area  swelling / firm mass, trachea midline, No JVD, NO stridor  Respiratory: CTA B/L, No wheezing, rales, rhonchi  CV: Normal rate regular rhythm, S1S2, no murmurs, rubs or gallops  Abdominal: Soft, NT, ND +BS,   Extremities: No edema, + peripheral pulses  Skin: No Rashes, Hematoma, Ecchymosis    A/p  Left sided neck mass/ abscess - patient airway / able to eat  -IV abx   -check blood Cx   -PRN pain and PRN fever control/ warm compresses and massage  -IV decadron  -ENT follow up  -ID eval     H/o COPD   H/o Stage IV squamous cell lung cancer   - PRN bronchodilator   -F/u at [facility] [place]  -O2 via NC and pulse ox monitoring     DM type II   -Lantus / Lispro AC w/ F/s monitoring and ISS    Hematuria / Bladder wall thickening   -OP follow at [facility] [place]     DVT prophylaxis

## 2024-02-26 NOTE — H&P ADULT - NSHPREVIEWOFSYSTEMS_GEN_ALL_CORE
REVIEW OF SYSTEMS:    CONSTITUTIONAL: No weakness, fevers or chills  EYES/ENT: As per HPI mild throat pain and hoarse voice, No visual changes;  No vertigo, no nasal congestion  NECK: Mass in left submandibular part of neck, mild TTP  RESPIRATORY: No cough, wheezing, hemoptysis; shortness of breath slightly worsened  CARDIOVASCULAR: No chest pain or palpitations  GASTROINTESTINAL: No abdominal or epigastric pain. No nausea, vomiting, or hematemesis; No diarrhea or constipation. No melena or hematochezia.  GENITOURINARY: No dysuria, frequency or hematuria  HEMATOLOGIC: +history of easy bruising  NEUROLOGICAL: No numbness or weakness  SKIN: +redness overlying neck mass, No itching, rashes

## 2024-02-26 NOTE — ED PROVIDER NOTE - OBJECTIVE STATEMENT
68-year-old male past medical history of active stage IV squamous cell lung cancer (status post radiation, immunotherapy and experimental chemo, pending biopsy to direct further chemo) diabetes, hypertension, Presenting for enlarging left-sided neck mass for the past 4 days.  Patient reports he had a fever, cough, congestion 2 weeks ago that improved for few days, returned a week ago and resolved a few days ago.  Patient went to urgent care yesterday and was prescribed amoxicillin for possible throat infection (has taken 3 doses so far).  He denies any fever over the past few days but reports worsening pain in his neck, changes in his voice, pain on swallowing, increased difficulty breathing, especially when leaning back.  Denies any recent dental procedures

## 2024-02-26 NOTE — H&P ADULT - ASSESSMENT
68-year-old male past medical history of active stage IV squamous cell lung cancer (status post radiation, immunotherapy and experimental chemo, pending biopsy of bladder to direct further chemo follows at [facility]), diabetes, hypertension, COPD on 2.5L at home [place] ([facility] program), easy bruising, presenting for enlarging left-sided neck mass for the past 4 days.  Patient reports having cold symptoms 2 weeks ago of fevers, cough, sore throat and congestion that improved, then returned a week ago that resolved until he started experience left sided neck swelling 4 days ago. Patient went to urgent care yesterday, given Amoxicillin (or azithromycin, patient uncertain), for possible throat infection. Swelling continued to worsen so patient was seen at [facility], given Zosyn, decadron, CT neck showing mass/hlegmon vs abscess and transferred to [place] for management. patient reports some mild pain, hoarse voice and increased shortness of breathe these past few days. Denies any recent dental procedures, has dentures from gum disease.     #let neck mass/phlegmon  - No SIRS on admission (tachycaria, no elevated WBC, afebrile, no tachypnea)  - ENT recs appreciated  - Pain control PRN  - Supportive care sialogogues (sour candies, lemon in water), warm compress, gland massage  - Obtain blood cultures  - Consult ID  - Start decadron 10mg QD  - C/w zosyn  - Start Zyvox, as vancomycin is nephrotoxic     #COPD  - on 2.5L NC at home, on Breztri and albuterol PRN  - c/w symbicort while in patient or patient can use Breztri inhaler  - C/w albuterol PRN    #HTN  - monitor BP  - c/w home med lisinopril 10mg QD    #BPH  - on silodosin at home  - Will use tamsulosin 0.4mg while inpatient    #DM  - On metformin and pioglitazone at home  - Monitor FS and start SSI as needed if elevated    #stage 4 lung cancer  #hematuria  #bladder thickening  - S/p chemo, radiation, KRISTIN resection, immunotherapy, and experimental medication   - May have element of bladder cancer vs metastatic disease, being followed by [facility] in [place], has appointment on wednesday for evaluation as will tailor additional regimen  - Follow up as per [facility]  Diet: DASH, CC, soft  Activity: AAT  DVT Prophylaxis: lovenox  GI Prophylaxis: PPI while on steroids  Code Status: Full  Disposition: floors

## 2024-02-26 NOTE — ED PROVIDER NOTE - PROGRESS NOTE DETAILS
pt back from ct, called radiology for read on CT ct result back, ent called no answer Dr. Jose L mckeon pt protecting airway Spoke with ENT PA agrees to plan for [facility] for further eval BF: Pt arrived at [facility], no resp distress, comfortable appearing, tolerating secretions. ENT made aware and will come see patient.

## 2024-02-26 NOTE — ED PROVIDER NOTE - CLINICAL SUMMARY MEDICAL DECISION MAKING FREE TEXT BOX
Pt transferred from [facility] ER    67 y/o M h/o Stage IV SCC (s/p radiation, immunotherapy, experimental chemo, pending biopsy), DM, HTN p/w enlarging L sided neck mass x4 days. + fever, cough, congestion x2 weeks resolved over last several days. Was on amox at [facility], 3 doses in so far. At [facility], given Zosyn, decadron, CT showing mass/phlegmon vs abscess, transferred to [place]. Seen by ENT and admitted to higher level of care.

## 2024-02-26 NOTE — H&P ADULT - HISTORY OF PRESENT ILLNESS
68-year-old male past medical history of active stage IV squamous cell lung cancer (status post radiation, immunotherapy and experimental chemo, pending biopsy of bladder to direct further chemo [place] [facility] [place] diabetes, hypertension, COPD [place] 2.5L at home [place] ([employer] program), easy bruising, presenting for enlarging left-sided neck mass for the past 4 days.  Patient reports having cold symptoms 2 weeks ago of fevers, cough, sore throat and congestion that improved, then returned a week ago that resolved until he started experience left sided neck swelling 4 days ago. Patient went to urgent care yesterday, given Amoxicillin (or azithromycin, patient uncertain), for possible throat infection. Swelling continued to worsen so patient was seen at [facility], given Zosyn, decadron, CT neck showing mass/hlegmon vs abscess and transferred to [place] for management. patient reports some mild pain, hoarse voice and increased shortness of breathe these past few days. Denies any recent dental procedures, has dentures from gum disease. Patient does have a history of sialolith >10 years ago when calcium deposit was removed under tongue.     In the ED, patient hemodynamically stable, afebrile Tmax 97.1, HR 89, BP 130s-160s/70-80s.SpO2: 96% on 3 NC. ENT was consulted for left submandibular gland sialoadenitis without sialolith. nasolaryngoscopy showing patent airway, left TVC paresis (likely related to patient stage IVb lung Ca), do not visualize L BOT abscess (CT likely reflecting edema). Given Zosyn, vancomycin and decadron 10mg admitted to medicine.

## 2024-02-26 NOTE — ED ADULT NURSE NOTE - NSFALLUNIVINTERV_ED_ALL_ED
Bed/Stretcher in lowest position, wheels locked, appropriate side rails in place/Call bell, personal items and telephone in reach/Instruct patient to call for assistance before getting out of bed/chair/stretcher/Non-slip footwear applied when patient is off stretcher/[place] to call system/Physically safe environment - no spills, clutter or unnecessary equipment/Purposeful proactive rounding/Room/bathroom lighting operational, light cord in reach

## 2024-02-26 NOTE — H&P ADULT - NSHPSOCIALHISTORY_GEN_ALL_CORE
Former smoker 1ppd x 20 years, quit 12 years ago. Previously worked in construction and restaurants, now retired. Part of the [employer] program for exposure/COPD, [place] 2.5L at home [place]. No alcohol or illicit drug use.

## 2024-02-26 NOTE — CONSULT NOTE ADULT - ASSESSMENT
Pt is a 69yo Male who presents with left sided neck swelling for the past 4 days, left SMG sialoadenitis without sialolith. FFL shows patent airway, left TVC paresis (likely related to patient stage IVb lung Ca), do not visualize L BOT abscess (CT likely reflecting edema).    ·	cont IV abx  ·	warm compress to area, gland massage, sialogogues (sour candies, lemon in water)  ·	IV decadron/solumedrol  ·	pain control prn  ·	HOB elevated  ·	soft diet as tolerated  ·	w/d with attng, will follow

## 2024-02-26 NOTE — CONSULT NOTE ADULT - SUBJECTIVE AND OBJECTIVE BOX
Pt is a 69yo Male who presents with left sided neck swelling for the past 4 days, worsening. PT states he had URI twice in the past few weeks, recently noticed worsening sore throat, cough - although normally has "lung issues". Patient states he noticed the left side of his neck get swollen, continued to worsen. Pt able to tolerate PO, but has noticed his voice has changed as well. PT normally on and off nasal cannula at home for his left hilar stage IV lung cancer. Currently denies any SOB/diff breathing. Denies fever.     PAST MEDICAL & SURGICAL HISTORY:  DM  HTN (hypertension)  Prostate disease  COPD (chronic obstructive pulmonary disease)  Current smoker  No significant past surgical history      MEDICATIONS  (STANDING):  Albuterol  Metformin    Allergies    No Known Allergies    Intolerances    FAMILY HISTORY:  No pertinent family history in first degree relatives      REVIEW OF SYSTEMS   [x] A ten-point review of systems was otherwise negative except as noted.    Vital Signs Last 24 Hrs  T(C): 36.2 (26 Feb 2024 10:08), Max: 36.2 (26 Feb 2024 10:08)  T(F): 97.1 (26 Feb 2024 10:08), Max: 97.1 (26 Feb 2024 10:08)  HR: 89 (26 Feb 2024 16:01) (89 - 118)  BP: 169/86 (26 Feb 2024 16:01) (136/75 - 169/86)  RR: 18 (26 Feb 2024 16:01) (18 - 18)  SpO2: 96% (26 Feb 2024 16:01) (96% - 98%)    Parameters below as of 26 Feb 2024 16:01  Patient On (Oxygen Delivery Method): nasal cannula    GEN: NAD, awake and alert. No drooling or pooling of secretions. No stridor or stertor. + hoarseness.   SKIN: Good color, non diaphoretic.  HEENT: Oral mucosa pink and moist. No erythema or edema noted to buccal mucosa, tongue, FOM, uvula or posterior oropharynx. Uvula midline. no FOMT, no edema. No drainage from the duct with gland massage.  NECK: Trachea midline, + diffuse neck edema without erythema, mostly on the left but extends past midline. + palpable firm SMG on the left.  RESP: No dyspnea, non-labored breathing. No use of accessory muscles. + NC in place.   CARDIO: +S1/S2  ABDO: Soft, NT.  EXT: FAUST x 4    Fiberoptic Laryngoscopy: See attng note.    LABS:                        13.2   6.30  )-----------( 225      ( 26 Feb 2024 11:26 )             39.7     02-26    137  |  98  |  19  ----------------------------<  190<H>  4.3   |  27  |  0.9    Ca    9.6      26 Feb 2024 11:26    TPro  6.4  /  Alb  3.8  /  TBili  0.3  /  DBili  x   /  AST  15  /  ALT  21  /  AlkPhos  53  02-26      RADIOLOGY & ADDITIONAL STUDIES:    < from: CT Neck Soft Tissue w/ IV Cont (02.26.24 @ 13:15) >    ACC: [id number] EXAM:  CT NECK SOFT TISSUE IC   ORDERED BY: PARTHA BENAVIDEZ     PROCEDURE DATE:  02/26/2024      INTERPRETATION:  Clinical History / Reason for exam: History of Stage IV   lung cancer. Neck mass.    TECHNIQUE:  CT neck with IV contrast. Multiple CT axial images of the   neck obtained following administration of 100 cc Omnipaque 350   intravenous contrast with coronal and sagittal re-formations.    COMPARISON: None available    FINDINGS:    The left submandibular gland is enlarged and edematous with surrounding   inflammatory changes consistent with sialoadenitis. No sialolithiasis is   demonstrated. There is no evidence of ductal dilatation.    There is extensive edema involving the left oropharyngeal and   supraglottic laryngeal walls, with enlarged, edematous left palatine   tonsil, epiglottis and left aryepiglottic fold. There is apparent 3 x 2.5   cm fluid collection at the left tongue base. Infiltration of the left   parapharyngeal fat planes.    The visualized intracranial and intraorbital contents are unremarkable.    There is minor scattered paranasal sinus mucosal thickening. The mastoid   air cells are clear.    The parotid and right submandibular glands are unremarkable.    There is no CT evidence of pathologic cervical lymphadenopathy.    There is a subcentimeter right thyroid lobe nodule.    There is partially visualized left hilar and lower lobe irregular   spiculated masslike soft tissue with surrounding nodularity. Right upper   lobe pulmonary nodule measures 4 mm.    Degenerative changes of the spine.    IMPRESSION:    1.  Enlarged, edematous left submandibular gland with surrounding   inflammatory changes consistent with sialoadenitis. No sialolithiasis.    2.  Extensive edema involving theleft oropharyngeal and supraglottic   laryngeal walls, with enlarged, edematous epiglottis and left   aryepiglottic fold. Apparent 3 x 2.5 cm fluid collection at the left   tongue base consistent with severe edema or phlegmon/abscess.    3.  Partially visualized left hilar / lower lobe irregular spiculated   pulmonary mass with surrounding smaller nodules, and a right upper lobe   pulmonary nodule measuring 4 mm. Correlation with dedicated chest CT is   recommended if not recently performed.    --- End of Report ---      NAE ROCK MD; Attending Radiologist  This document has been electronically signed. Feb 26 2024  1:55PM

## 2024-02-26 NOTE — H&P ADULT - NSICDXPASTSURGICALHX_GEN_ALL_CORE_FT
PAST SURGICAL HISTORY:  History of liver biopsy     History of lung biopsy     Status post lobectomy of lung

## 2024-02-26 NOTE — H&P ADULT - NSHPPHYSICALEXAM_GEN_ALL_CORE
T(C): 36.2 (02-26-24 @ 10:08), Max: 36.2 (02-26-24 @ 10:08)  HR: 89 (02-26-24 @ 16:01) (89 - 118)  BP: 169/86 (02-26-24 @ 16:01) (136/75 - 169/86)  RR: 18 (02-26-24 @ 16:01) (18 - 18)  SpO2: 96% (02-26-24 @ 16:01) (96% - 98%)    CONSTITUTIONAL: Well groomed, no apparent distress  EYES: PERRLA and symmetric, EOMI, No conjunctival or scleral injection, non-icteric  ENMT: Oral mucosa with moist membranes. s/p dental extraction; no pharyngeal injection or exudates  NECK: firm slightly tender mass midline and left side of neck under jaw with mild erythema, no warmth  RESP: No respiratory distress, no use of accessory muscles; CTA b/l, no WRR  CV: RRR, +S1S2, no MRG; no JVD; no peripheral edema  GI: Soft, NT, ND, no rebound, no guarding; no palpable masses; no hepatosplenomegaly; no hernia palpated  LYMPH: cervical mass vs lymphadenopathy in submandibular neck, no axillary LAD or tenderness; no inguinal LAD or tenderness  MSK: Normal gait; No digital clubbing or cyanosis; Normal ROM without pain, normal muscle strength/tone  SKIN: multiple bruises noted on hands and arms, no rashes or ulcers noted; no subcutaneous nodules or induration palpable  NEURO: CN II-XII intact; normal reflexes in upper and lower extremities, sensation intact in upper and lower extremities b/l to light touch   PSYCH: Appropriate insight/judgment; A+O x 3, mood and affect appropriate, recent/remote memory intact

## 2024-02-26 NOTE — ED ADULT NURSE NOTE - NSICDXPASTMEDICALHX_GEN_ALL_CORE_FT
PAST MEDICAL HISTORY:  COPD (chronic obstructive pulmonary disease)     Current smoker     HTN (hypertension)     Prostate disease

## 2024-02-26 NOTE — ED PROVIDER NOTE - PHYSICAL EXAMINATION
CONSTITUTIONAL: NAD  SKIN: Warm, dry  HEAD: NCAT  EYES: Clear conjunctiva   ENT: MMM, edentulous, no swelling, tenderness or fluctuance to floor of the mouth, inner cheek. +tonsilar swelling no visible exudates. Left TM with fluid, nonbulging, right TM pearly, light reflex to bilateral TM intact  NECK: significant neck swelling with warmth, tenderness to the left side  CARD: RRR, S1, S2; no M/R/G  RESP: mildly tachypneic on laying down, +rhonchi bilaterally, mild wheezing to left upper lung  ABD: Soft, nontender. No rebound, rigidity, or guarding  EXT: Pulses palpable distally  NEURO: Grossly intact. Awake, alert, moving all extremities, no facial asymmetry.

## 2024-02-26 NOTE — H&P ADULT - NSHPLABSRESULTS_GEN_ALL_CORE
.  LABS:                         13.2   6.30  )-----------( 225      ( 26 Feb 2024 11:26 )             39.7     02-26    137  |  98  |  19  ----------------------------<  190<H>  4.3   |  27  |  0.9    Ca    9.6      26 Feb 2024 11:26    TPro  6.4  /  Alb  3.8  /  TBili  0.3  /  DBili  x   /  AST  15  /  ALT  21  /  AlkPhos  53  02-26      Urinalysis Basic - ( 26 Feb 2024 11:26 )    Color: x / Appearance: x / SG: x / pH: x  Gluc: 190 mg/dL / Ketone: x  / Bili: x / Urobili: x   Blood: x / Protein: x / Nitrite: x   Leuk Esterase: x / RBC: x / WBC x   Sq Epi: x / Non Sq Epi: x / Bacteria: x                RADIOLOGY, EKG & ADDITIONAL TESTS: Reviewed.    PROCEDURE DATE:  02/26/2024          INTERPRETATION:  Clinical History / Reason for exam: History of Stage IV   lung cancer. Neck mass.    TECHNIQUE:  CT neck with IV contrast. Multiple CT axial images of the   neck obtained following administration of 100 cc Omnipaque 350   intravenous contrast with coronal and sagittal re-formations.    COMPARISON: None available    FINDINGS:    The left submandibular gland is enlarged and edematous with surrounding   inflammatory changes consistent with sialoadenitis. No sialolithiasis is   demonstrated. There is no evidence of ductal dilatation.    There is extensive edema involving the left oropharyngeal and   supraglottic laryngeal walls, with enlarged, edematous left palatine   tonsil, epiglottis and left aryepiglottic fold. There is apparent 3 x 2.5   cm fluid collection at the left tongue base. Infiltration of the left   parapharyngeal fat planes.    The visualized intracranial and intraorbital contents are unremarkable.    There is minor scattered paranasal sinus mucosal thickening. The mastoid   air cells are clear.    The parotid and right submandibular glands are unremarkable.    There is no CT evidence of pathologic cervical lymphadenopathy.    There is a subcentimeter right thyroid lobe nodule.    There is partially visualized left hilar and lower lobe irregular   spiculated masslike soft tissue with surrounding nodularity. Right upper   lobe pulmonary nodule measures 4 mm.    Degenerative changes of the spine.    IMPRESSION:    1.  Enlarged, edematous left submandibular gland with surrounding   inflammatory changes consistent with sialoadenitis. No sialolithiasis.    2.  Extensive edema involving theleft oropharyngeal and supraglottic   laryngeal walls, with enlarged, edematous epiglottis and left   aryepiglottic fold. Apparent 3 x 2.5 cm fluid collection at the left   tongue base consistent with severe edema or phlegmon/abscess.    3.  Partially visualized left hilar / lower lobe irregular spiculated   pulmonary mass with surrounding smaller nodules, and a right upper lobe   pulmonary nodule measuring 4 mm. Correlation with dedicated chest CT is   recommended if not recently performed.

## 2024-02-27 ENCOUNTER — TRANSCRIPTION ENCOUNTER (OUTPATIENT)
Age: 69
End: 2024-02-27

## 2024-02-27 VITALS
SYSTOLIC BLOOD PRESSURE: 143 MMHG | OXYGEN SATURATION: 100 % | DIASTOLIC BLOOD PRESSURE: 74 MMHG | HEART RATE: 75 BPM | TEMPERATURE: 99 F | RESPIRATION RATE: 18 BRPM

## 2024-02-27 LAB — MRSA PCR RESULT.: NEGATIVE — SIGNIFICANT CHANGE UP

## 2024-02-27 PROCEDURE — 99239 HOSP IP/OBS DSCHRG MGMT >30: CPT

## 2024-02-27 RX ADMIN — PIPERACILLIN AND TAZOBACTAM 25 GRAM(S): 4; .5 INJECTION, POWDER, LYOPHILIZED, FOR SOLUTION INTRAVENOUS at 06:02

## 2024-02-27 RX ADMIN — PIPERACILLIN AND TAZOBACTAM 25 GRAM(S): 4; .5 INJECTION, POWDER, LYOPHILIZED, FOR SOLUTION INTRAVENOUS at 09:10

## 2024-02-27 RX ADMIN — LINEZOLID 300 MILLIGRAM(S): 600 INJECTION, SOLUTION INTRAVENOUS at 06:02

## 2024-02-27 RX ADMIN — ENOXAPARIN SODIUM 40 MILLIGRAM(S): 100 INJECTION SUBCUTANEOUS at 06:04

## 2024-02-27 RX ADMIN — Medication 102 MILLIGRAM(S): at 05:52

## 2024-02-27 RX ADMIN — LISINOPRIL 10 MILLIGRAM(S): 2.5 TABLET ORAL at 06:04

## 2024-02-27 RX ADMIN — BUDESONIDE AND FORMOTEROL FUMARATE DIHYDRATE 2 PUFF(S): 160; 4.5 AEROSOL RESPIRATORY (INHALATION) at 08:55

## 2024-02-27 NOTE — PROGRESS NOTE ADULT - SUBJECTIVE AND OBJECTIVE BOX
ENT PROGRESS NOTE    Pt is a 68y M a/w neck swelling, L SMG sialoadenitis without sialolith. Pt seen and examined at bedside today AM. Pt resting comfortably in bed. Denies complaints at this time.     REVIEW OF SYSTEMS   [x] A ten-point review of systems was otherwise negative except as noted.    Allergies  No Known Allergies    MEDICATIONS:  acetaminophen     Tablet .. 650 milliGRAM(s) Oral every 6 hours PRN  albuterol    90 MICROgram(s) HFA Inhaler 1 Puff(s) Inhalation four times a day PRN  atorvastatin 10 milliGRAM(s) Oral at bedtime  budesonide 160 MICROgram(s)/formoterol 4.5 MICROgram(s) Inhaler 2 Puff(s) Inhalation two times a day  dexAMETHasone  IVPB 10 milliGRAM(s) IV Intermittent daily  enoxaparin Injectable 40 milliGRAM(s) SubCutaneous every 24 hours  linezolid  IVPB 600 milliGRAM(s) IV Intermittent every 12 hours  lisinopril 10 milliGRAM(s) Oral daily  melatonin 3 milliGRAM(s) Oral at bedtime PRN  piperacillin/tazobactam IVPB.- 3.375 Gram(s) IV Intermittent once  piperacillin/tazobactam IVPB.. 3.375 Gram(s) IV Intermittent every 8 hours  tamsulosin 0.4 milliGRAM(s) Oral at bedtime      Vital Signs Last 24 Hrs  T(C): 37 (27 Feb 2024 07:40), Max: 37 (27 Feb 2024 07:40)  T(F): 98.6 (27 Feb 2024 07:40), Max: 98.6 (27 Feb 2024 07:40)  HR: 75 (27 Feb 2024 07:40) (75 - 118)  BP: 143/74 (27 Feb 2024 07:40) (123/63 - 169/86)  BP(mean): 100 (27 Feb 2024 07:40) (100 - 100)  RR: 18 (27 Feb 2024 07:40) (18 - 18)  SpO2: 100% (27 Feb 2024 07:40) (96% - 100%)    Parameters below as of 27 Feb 2024 07:40  Patient On (Oxygen Delivery Method): nasal cannula      PHYSICAL EXAM:  GEN: NAD  NEURO: Awake and alert.   HEENT: Oral mucosa pink and moist. No erythema or edema noted to buccal mucosa, tongue, FOM, uvula or posterior oropharynx. Uvula midline. no FOMT, no edema. No drainage from the duct with gland massage.  NECK: Trachea midline, + diffuse neck edema mostly on the left but extends past midline. + palpable firm SMG on the left.  RESP: No dyspnea, non-labored breathing. No use of accessory muscles.  CARDIO: +S1/S2  ABDO: Soft, NT  EXT: FAUST x 4      LABS:  CBC-                        13.2   6.30  )-----------( 225      ( 26 Feb 2024 11:26 )             39.7     BMP/CMP-  26 Feb 2024 11:26    137    |  98     |  19     ----------------------------<  190    4.3     |  27     |  0.9      Ca    9.6        26 Feb 2024 11:26    TPro  6.4    /  Alb  3.8    /  TBili  0.3    /  DBili  x      /  AST  15     /  ALT  21     /  AlkPhos  53     26 Feb 2024 11:26

## 2024-02-27 NOTE — DISCHARGE NOTE PROVIDER - HOSPITAL COURSE
68-year-old male past medical history of active stage IV squamous cell lung cancer (status post radiation, immunotherapy and experimental chemo, pending biopsy of bladder to direct further [place] [facility] diabetes, hypertension, COPD [place] 2.[place] at home [place] ([employer] [place]), easy bruising, presenting for enlarging left-sided neck mass for the past 4 days.  Patient reports having cold symptoms 2 weeks ago of fevers, cough, sore throat and congestion that improved, then returned a week ago that resolved until he started experience left sided neck swelling 4 days ago. Patient went to urgent care yesterday, given Amoxicillin (or azithromycin, patient uncertain), for possible throat infection. Swelling continued to worsen so patient was seen at [facility], given Zosyn, decadron, CT neck showing mass/hlegmon vs abscess and transferred to [place] for management. patient reports some mild pain, hoarse voice and increased shortness of breathe these past few days. Denies any recent dental procedures, has dentures from gum disease. Patient does have a history of sialolith >10 years ago when calcium deposit was removed under tongue.     In the ED, patient hemodynamically stable, afebrile Tmax 97.1, HR 89, BP 130s-160s/70-80s.SpO2: 96% on 3 NC. ENT was consulted for left submandibular gland sialoadenitis without sialolith. nasolaryngoscopy showing patent airway, left TVC paresis (likely related to patient stage IVb lung Ca), do not visualize L BOT abscess (CT likely reflecting edema). Given Zosyn, vancomycin and decadron 10mg admitted to medicine.       ENT >    L SMG sialoadenitis without sialolith.     ·cont IV abx  ·warm compress to area, gland massage, sialogogues (sour candies, lemon in water)  ·cont with steroids   ·pain control prn  ·HOB elevated  ·soft diet as tolerated    After admission the patient was started on iv zyvox and zosyn as well as decadron 10 mg q24   patient says he is feeling better and would like to be discharged for an appointment at [place] for his Lung CA      patient to be dc'ed on PO clindamycin and prednisone

## 2024-02-27 NOTE — DISCHARGE NOTE NURSING/CASE MANAGEMENT/SOCIAL WORK - PATIENT PORTAL LINK FT
You can access the FollowMyHealth Patient Portal offered by [facility] by registering at the following website: [email or web address]. By joining [employer]’s FollowMyHealth portal, you will also be able to view your health information using other applications (apps) compatible with our system.

## 2024-02-27 NOTE — DISCHARGE NOTE PROVIDER - CARE PROVIDER_API CALL
Jigna, Tecumseh Mayito  Otolaryngology  [address] [place] [address], [address]  [place] [address]  Phone: [phone]  Fax: [phone]  Follow Up Time: 1 week    OncologistMEGAN  Phone: (   )    -  Fax: (   )    -  Follow Up Time: 1-3 days

## 2024-02-27 NOTE — DISCHARGE NOTE PROVIDER - NSDCCPCAREPLAN_GEN_ALL_CORE_FT
PRINCIPAL DISCHARGE DIAGNOSIS  Diagnosis: Neck abscess  Assessment and Plan of Treatment: you had an inflammation in your left submandibular gland which is most likely secondary to upper respiratory tract infection that you had.   you took IV antibiotics and IV steroids while in [place]   please continue taking the oral antibiotic for 10 days as prescribed and the oral steroids for 5 days   if you have any trouble breathing , hoarsness of voice, fever , worsening swelling , chest pain or tightness please return to ED  follow with your cancer doctor ( oncologist ) and with ENT doctor as outpatient

## 2024-02-27 NOTE — PROGRESS NOTE ADULT - ASSESSMENT
Pt is a 68y M a/w neck swelling, L SMG sialoadenitis without sialolith.     ·	cont IV abx  ·	warm compress to area, gland massage, sialogogues (sour candies, lemon in water)  ·	cont with steroids   ·	pain control prn  ·	HOB elevated  ·	soft diet as tolerated  ·	w/d with attng, will follow

## 2024-02-27 NOTE — DISCHARGE NOTE PROVIDER - NSDCMRMEDTOKEN_GEN_ALL_CORE_FT
atorvastatin 10 mg oral tablet: 1 tab(s) orally once a day  Breztri Aerosphere 160 mcg-9 mcg-4.8 mcg/inh inhalation aerosol: 2 puff(s) inhaled 2 times a day  clindamycin 300 mg oral capsule: 1 cap(s) orally every 6 hours  lisinopril 10 mg oral tablet: 1 tab(s) orally once a day  metFORMIN 500 mg oral tablet: 1 tab(s) orally 2 times a day   pioglitazone 30 mg oral tablet: 1 tab(s) orally once a day  predniSONE 20 mg oral tablet: 1 tab(s) orally 2 times a day  ProAir HFA 90 mcg/inh inhalation aerosol: 2 puff(s) inhaled 4 times a day, As Needed  silodosin 8 mg oral capsule: 1 cap(s) orally once a day with food

## 2024-02-27 NOTE — PATIENT PROFILE ADULT - FALL HARM RISK - RISK INTERVENTIONS

## 2024-02-27 NOTE — DISCHARGE NOTE PROVIDER - PROVIDER TOKENS
PROVIDER:[TOKEN:[[id number]],FOLLOWUP:[1 week]],FREE:[LAST:[Oncologist],PHONE:[(   )    -],FAX:[(   )    -],ADDRESS:[[facility]FOLLOWUP:[1-3 days]]

## 2024-02-27 NOTE — DISCHARGE NOTE PROVIDER - CARE PROVIDERS DIRECT ADDRESSES
[email or web address]

## 2024-03-03 LAB
CULTURE RESULTS: SIGNIFICANT CHANGE UP
SPECIMEN SOURCE: SIGNIFICANT CHANGE UP

## 2024-03-05 DIAGNOSIS — K11.20 SIALOADENITIS, UNSPECIFIED: ICD-10-CM

## 2024-03-05 DIAGNOSIS — N40.0 BENIGN PROSTATIC HYPERPLASIA WITHOUT LOWER URINARY TRACT SYMPTOMS: ICD-10-CM

## 2024-03-05 DIAGNOSIS — Z92.3 PERSONAL HISTORY OF IRRADIATION: ICD-10-CM

## 2024-03-05 DIAGNOSIS — Z99.81 DEPENDENCE ON SUPPLEMENTAL OXYGEN: ICD-10-CM

## 2024-03-05 DIAGNOSIS — Z65.4 VICTIM OF CRIME AND TERRORISM: ICD-10-CM

## 2024-03-05 DIAGNOSIS — I10 ESSENTIAL (PRIMARY) HYPERTENSION: ICD-10-CM

## 2024-03-05 DIAGNOSIS — J44.9 CHRONIC OBSTRUCTIVE PULMONARY DISEASE, UNSPECIFIED: ICD-10-CM

## 2024-03-05 DIAGNOSIS — N32.89 OTHER SPECIFIED DISORDERS OF BLADDER: ICD-10-CM

## 2024-03-05 DIAGNOSIS — C34.90 MALIGNANT NEOPLASM OF UNSPECIFIED PART OF UNSPECIFIED BRONCHUS OR LUNG: ICD-10-CM

## 2024-03-05 DIAGNOSIS — Z79.51 LONG TERM (CURRENT) USE OF INHALED STEROIDS: ICD-10-CM

## 2024-03-05 DIAGNOSIS — J38.01 PARALYSIS OF VOCAL CORDS AND LARYNX, UNILATERAL: ICD-10-CM

## 2024-03-05 DIAGNOSIS — Z87.891 PERSONAL HISTORY OF NICOTINE DEPENDENCE: ICD-10-CM

## 2024-03-05 DIAGNOSIS — Z79.84 LONG TERM (CURRENT) USE OF ORAL HYPOGLYCEMIC DRUGS: ICD-10-CM

## 2024-03-05 DIAGNOSIS — E11.9 TYPE 2 DIABETES MELLITUS WITHOUT COMPLICATIONS: ICD-10-CM

## 2024-05-19 NOTE — DISCHARGE NOTE PROVIDER - ATTENDING DISCHARGE PHYSICAL EXAMINATION:
PAST SURGICAL HISTORY:  No significant past surgical history Gen- elderly M, NAD,non toxic appearing  Eyes- anicteric sclera, non injected conjunctiva, EOMI  ENT- left sided neck swelling, no overlying erythema  Chest- symmetrical chest rise, no accessory muscle use, no audible wheezing  Cardiac- non tachycardic  Abdominal- protuberant  Skin- warm, dry, intact  Neuro- Aox3, normal mentation  Ext- spontaneously moving all 4 ext against gravity

## 2025-01-02 NOTE — ED PROVIDER NOTE - MUSCULOSKELETAL NEGATIVE STATEMENT, MLM
Detail Level: Zone
Extraction Method: cotton-tipped applicator
Treatment Type (Optional): Acne Facial
Price (Use Numbers Only, No Special Characters Or $): 0
Treatment Serum Override: hydrinity restorative serum
no back pain, no gout, no musculoskeletal pain, no neck pain, and no weakness.

## 2025-06-16 PROBLEM — J44.9 CHRONIC OBSTRUCTIVE PULMONARY DISEASE, UNSPECIFIED COPD TYPE: Status: ACTIVE | Noted: 2019-02-12

## 2025-06-16 RX ORDER — FLUTICASONE FUROATE, UMECLIDINIUM BROMIDE AND VILANTEROL TRIFENATATE 200; 62.5; 25 UG/1; UG/1; UG/1
200-62.5-25 POWDER RESPIRATORY (INHALATION) DAILY
Qty: 3 | Refills: 3 | Status: ACTIVE | COMMUNITY
Start: 2025-06-16 | End: 1900-01-01

## 2025-06-17 ENCOUNTER — NON-APPOINTMENT (OUTPATIENT)
Age: 70
End: 2025-06-17

## 2025-06-17 PROBLEM — Z82.49 FAMILY HISTORY OF CORONARY ARTERY DISEASE: Status: ACTIVE | Noted: 2025-06-17

## 2025-06-17 PROBLEM — Z87.09 HISTORY OF BRONCHITIS: Status: RESOLVED | Noted: 2025-06-17 | Resolved: 2025-06-17

## 2025-06-17 PROBLEM — Z83.3 FAMILY HISTORY OF DIABETES MELLITUS: Status: ACTIVE | Noted: 2025-06-17

## 2025-06-17 RX ORDER — AZELASTINE HYDROCHLORIDE 137 UG/1
0.1 SPRAY, METERED NASAL
Refills: 0 | Status: ACTIVE | COMMUNITY

## 2025-06-17 RX ORDER — BUDESONIDE AND FORMOTEROL FUMARATE DIHYDRATE 160; 4.5 UG/1; UG/1
160-4.5 AEROSOL RESPIRATORY (INHALATION) DAILY
Refills: 0 | Status: ACTIVE | COMMUNITY

## 2025-06-17 RX ORDER — SILODOSIN 8 MG/1
8 CAPSULE ORAL DAILY
Refills: 0 | Status: ACTIVE | COMMUNITY

## 2025-06-23 RX ORDER — ALBUTEROL SULFATE 90 UG/1
108 (90 BASE) INHALANT RESPIRATORY (INHALATION)
Qty: 3 | Refills: 3 | Status: ACTIVE | COMMUNITY
Start: 1900-01-01 | End: 1900-01-01

## 2025-07-14 ENCOUNTER — APPOINTMENT (OUTPATIENT)
Age: 70
End: 2025-07-14
Payer: COMMERCIAL

## 2025-07-14 VITALS
OXYGEN SATURATION: 95 % | WEIGHT: 210 LBS | HEIGHT: 70 IN | SYSTOLIC BLOOD PRESSURE: 149 MMHG | DIASTOLIC BLOOD PRESSURE: 78 MMHG | TEMPERATURE: 98.1 F | HEART RATE: 80 BPM | RESPIRATION RATE: 14 BRPM | BODY MASS INDEX: 30.06 KG/M2

## 2025-07-14 PROBLEM — C34.12 MALIGNANT NEOPLASM OF UPPER LOBE OF LEFT LUNG: Status: ACTIVE | Noted: 2025-07-14

## 2025-07-14 PROBLEM — J44.9 COPD (CHRONIC OBSTRUCTIVE PULMONARY DISEASE): Status: ACTIVE | Noted: 2025-06-17

## 2025-07-14 PROBLEM — R05.3 CHRONIC COUGH: Status: ACTIVE | Noted: 2025-07-14

## 2025-07-14 PROBLEM — Z85.51 HISTORY OF CARCINOMA OF BLADDER: Status: RESOLVED | Noted: 2025-07-14 | Resolved: 2025-07-14

## 2025-07-14 PROBLEM — Z03.89 ENCOUNTER FOR OBSERVATION FOR OTHER SUSPECTED DISEASES AND CONDITIONS RULED OUT: Status: ACTIVE | Noted: 2025-07-14

## 2025-07-14 PROBLEM — C34.11 MALIGNANT NEOPLASM OF UPPER LOBE OF RIGHT LUNG: Status: ACTIVE | Noted: 2025-07-14

## 2025-07-14 PROBLEM — J84.10 FIBROSIS LUNG: Status: ACTIVE | Noted: 2025-07-14

## 2025-07-14 PROBLEM — J70.1 RADIATION FIBROSIS OF LUNG: Status: RESOLVED | Noted: 2025-07-14 | Resolved: 2025-07-14

## 2025-07-14 PROBLEM — R06.02 SOB (SHORTNESS OF BREATH) ON EXERTION: Status: ACTIVE | Noted: 2025-07-14

## 2025-07-14 PROBLEM — Z86.79 HISTORY OF HYPERTENSION: Status: RESOLVED | Noted: 2025-07-14 | Resolved: 2025-07-14

## 2025-07-14 PROBLEM — R09.82 PND (POST-NASAL DRIP): Status: ACTIVE | Noted: 2025-07-14

## 2025-07-14 PROBLEM — Z87.891 FORMER SMOKER: Status: ACTIVE | Noted: 2025-07-14

## 2025-07-14 PROBLEM — Z85.05 HISTORY OF LIVER CANCER: Status: RESOLVED | Noted: 2025-07-14 | Resolved: 2025-07-14

## 2025-07-14 PROCEDURE — 99204 OFFICE O/P NEW MOD 45 MIN: CPT | Mod: 25

## 2025-07-14 PROCEDURE — 94060 EVALUATION OF WHEEZING: CPT

## 2025-07-14 PROCEDURE — 94729 DIFFUSING CAPACITY: CPT

## 2025-07-14 PROCEDURE — 94727 GAS DIL/WSHOT DETER LNG VOL: CPT

## 2025-07-14 RX ORDER — SEMAGLUTIDE 1.34 MG/ML
2 INJECTION, SOLUTION SUBCUTANEOUS
Refills: 0 | Status: ACTIVE | COMMUNITY

## 2025-07-14 RX ORDER — PREDNISONE 20 MG/1
20 TABLET ORAL DAILY
Qty: 21 | Refills: 2 | Status: ACTIVE | COMMUNITY
Start: 2025-07-14 | End: 1900-01-01